# Patient Record
Sex: MALE | Race: WHITE
[De-identification: names, ages, dates, MRNs, and addresses within clinical notes are randomized per-mention and may not be internally consistent; named-entity substitution may affect disease eponyms.]

---

## 2018-07-05 ENCOUNTER — HOSPITAL ENCOUNTER (INPATIENT)
Dept: HOSPITAL 74 - JER | Age: 73
LOS: 5 days | Discharge: HOME | DRG: 392 | End: 2018-07-10
Attending: INTERNAL MEDICINE | Admitting: INTERNAL MEDICINE
Payer: COMMERCIAL

## 2018-07-05 VITALS — BODY MASS INDEX: 28.3 KG/M2

## 2018-07-05 DIAGNOSIS — E66.8: ICD-10-CM

## 2018-07-05 DIAGNOSIS — E11.9: ICD-10-CM

## 2018-07-05 DIAGNOSIS — K76.9: ICD-10-CM

## 2018-07-05 DIAGNOSIS — N28.9: ICD-10-CM

## 2018-07-05 DIAGNOSIS — R91.1: ICD-10-CM

## 2018-07-05 DIAGNOSIS — Z87.891: ICD-10-CM

## 2018-07-05 DIAGNOSIS — I10: ICD-10-CM

## 2018-07-05 DIAGNOSIS — I51.7: ICD-10-CM

## 2018-07-05 DIAGNOSIS — R91.8: ICD-10-CM

## 2018-07-05 DIAGNOSIS — K63.5: ICD-10-CM

## 2018-07-05 DIAGNOSIS — N17.9: ICD-10-CM

## 2018-07-05 DIAGNOSIS — E78.5: ICD-10-CM

## 2018-07-05 DIAGNOSIS — E86.0: ICD-10-CM

## 2018-07-05 DIAGNOSIS — R93.2: ICD-10-CM

## 2018-07-05 DIAGNOSIS — E87.2: ICD-10-CM

## 2018-07-05 DIAGNOSIS — K57.32: Primary | ICD-10-CM

## 2018-07-05 LAB
ALBUMIN SERPL-MCNC: 4 G/DL (ref 3.4–5)
ALP SERPL-CCNC: 93 U/L (ref 45–117)
ALT SERPL-CCNC: 32 U/L (ref 12–78)
ANION GAP SERPL CALC-SCNC: 12 MMOL/L (ref 8–16)
AST SERPL-CCNC: 24 U/L (ref 15–37)
BASOPHILS # BLD: 0.6 % (ref 0–2)
BILIRUB SERPL-MCNC: 0.9 MG/DL (ref 0.2–1)
BUN SERPL-MCNC: 27 MG/DL (ref 7–18)
CALCIUM SERPL-MCNC: 9.1 MG/DL (ref 8.5–10.1)
CHLORIDE SERPL-SCNC: 106 MMOL/L (ref 98–107)
CO2 SERPL-SCNC: 22 MMOL/L (ref 21–32)
CREAT SERPL-MCNC: 1.5 MG/DL (ref 0.7–1.3)
DEPRECATED RDW RBC AUTO: 13.8 % (ref 11.9–15.9)
EOSINOPHIL # BLD: 0.7 % (ref 0–4.5)
GLUCOSE SERPL-MCNC: 191 MG/DL (ref 74–106)
HCT VFR BLD CALC: 42.8 % (ref 35.4–49)
HGB BLD-MCNC: 14.5 GM/DL (ref 11.7–16.9)
LYMPHOCYTES # BLD: 18.4 % (ref 8–40)
MCH RBC QN AUTO: 32.2 PG (ref 25.7–33.7)
MCHC RBC AUTO-ENTMCNC: 34 G/DL (ref 32–35.9)
MCV RBC: 94.6 FL (ref 80–96)
MONOCYTES # BLD AUTO: 8.2 % (ref 3.8–10.2)
NEUTROPHILS # BLD: 72.1 % (ref 42.8–82.8)
PLATELET # BLD AUTO: 250 K/MM3 (ref 134–434)
PMV BLD: 8.1 FL (ref 7.5–11.1)
POTASSIUM SERPLBLD-SCNC: 3.9 MMOL/L (ref 3.5–5.1)
PROT SERPL-MCNC: 7.5 G/DL (ref 6.4–8.2)
RBC # BLD AUTO: 4.52 M/MM3 (ref 4–5.6)
SODIUM SERPL-SCNC: 140 MMOL/L (ref 136–145)
WBC # BLD AUTO: 10.7 K/MM3 (ref 4–10)

## 2018-07-05 RX ADMIN — MORPHINE SULFATE PRN MG: 2 INJECTION, SOLUTION INTRAMUSCULAR; INTRAVENOUS at 17:02

## 2018-07-05 RX ADMIN — INSULIN ASPART SCH: 100 INJECTION, SOLUTION INTRAVENOUS; SUBCUTANEOUS at 21:15

## 2018-07-05 RX ADMIN — SODIUM CHLORIDE SCH MLS/HR: 9 INJECTION, SOLUTION INTRAVENOUS at 17:00

## 2018-07-05 RX ADMIN — MORPHINE SULFATE PRN MG: 2 INJECTION, SOLUTION INTRAMUSCULAR; INTRAVENOUS at 21:17

## 2018-07-05 RX ADMIN — INSULIN ASPART SCH: 100 INJECTION, SOLUTION INTRAVENOUS; SUBCUTANEOUS at 16:46

## 2018-07-05 RX ADMIN — HEPARIN SODIUM SCH UNIT: 5000 INJECTION, SOLUTION INTRAVENOUS; SUBCUTANEOUS at 21:12

## 2018-07-05 NOTE — PDOC
History of Present Illness





- General


Chief Complaint: Pain


Stated Complaint: ABDOMINAL LEIGH


Time Seen by Provider: 07/05/18 08:08





- History of Present Illness


Initial Comments: 





07/05/18 08:20


Mr. Cabrales is a 74 yo male w/ pmh of HTN, HLD, DM who presents for evaluation 

of 2 day history of abdominal pain. He reports it started 2 days ago with 

associated diarrhea and that afterwards he has had the sensation of needing to 

defecate however has not been able to. He localizes the pain to his lower 

abdomen. Patient reports he had a similar experience one time previously which 

self resolved after several days.





The patient denies chest pain, shortness of breath, headache and dizziness. 

Denies fever, chills, nausea, vomit, and constipation. Denies dysuria, frequency

, urgency and hematuria. 


Allergies: NKDA





Past History





- Past Medical History


Allergies/Adverse Reactions: 


 Allergies











Allergy/AdvReac Type Severity Reaction Status Date / Time


 


No Known Allergies Allergy   Verified 07/05/18 07:59











Home Medications: 


Ambulatory Orders





Lisinopril 20 mg PO DAILY 07/05/18 


Metformin HCl [Metformin HCl ER] 500 mg PO BID 07/05/18 


Multivitamin [Multiple Vitamins] 1 each PO DAILY 07/05/18 


Rosuvastatin [Crestor -] 20 mg PO DAILY 07/05/18 











- Suicide/Smoking/Psychosocial Hx


Smoking History: Never smoked





**Review of Systems





- Review of Systems


Comments:: 





07/05/18 08:41


GENERAL/CONSTITUTIONAL: No fever or chills. No weakness.


HEAD, EYES, EARS, NOSE AND THROAT: No change in vision. No ear pain or 

discharge. No sore throat.


CARDIOVASCULAR: No chest pain or shortness of breath


RESPIRATORY: No cough, wheezing, or hemoptysis.


GASTROINTESTINAL: +Diarrhea and abdominal pain as described. No nausea, vomiting

, or constipation.


GENITOURINARY: No dysuria, frequency, or change in urination.


MUSCULOSKELETAL: No joint or muscle swelling or pain. No neck or back pain.


SKIN: No rash


NEUROLOGIC: No headache, vertigo, loss of consciousness, or change in strength/

sensation.


ENDOCRINE: No increased thirst. No abnormal weight change


HEMATOLOGIC/LYMPHATIC: No anemia, easy bleeding, or history of blood clots.


ALLERGIC/IMMUNOLOGIC: No hives or skin allergy.





*Physical Exam





- Vital Signs


 Last Vital Signs











Temp Pulse Resp BP Pulse Ox


 


 98.1 F   57 L  16   136/54   8 L


 


 07/05/18 08:00  07/05/18 08:00  07/05/18 08:00  07/05/18 08:00  07/05/18 08:00














- Physical Exam


Comments: 





07/05/18 08:42


GENERAL: Awake, alert, and fully oriented, in no acute distress


HEAD: No signs of trauma, normocephalic, atraumatic 


EYES: PERRLA, EOMI, sclera anicteric, conjunctiva clear


ENT: Auricles normal inspection, hearing grossly normal, nares patent, 

oropharynx clear without


exudates. Moist mucosa


NECK: Normal ROM, supple, no lymphadenopathy, JVD, or masses


LUNGS: No distress, speaks full sentences, clear to auscultation bilaterally 


HEART: Regular rate and rhythm, normal S1 and S2, no murmurs, rubs or gallops, 

peripheral pulses normal and equal bilaterally. 


ABDOMEN: +Bilateral lower abdominal TTP (L>R). Soft, normoactive bowel sounds. 

No guarding, no rebound. No masses


EXTREMITIES: Normal inspection, Normal range of motion, no edema. No clubbing 

or cyanosis. 


NEUROLOGICAL: Cranial nerves II through XII grossly intact. Normal speech, 

normal gait, no focal sensorimotor deficits 


SKIN: Warm, Dry, normal turgor, no rashes or lesions noted. 





ED Treatment Course





- LABORATORY


CBC & Chemistry Diagram: 


 07/05/18 09:10





 07/05/18 09:10





Medical Decision Making





- Medical Decision Making





07/05/18 12:25


Mr. Cabrales is a 74 yo male w/ pmh as described who presents for evaluation of 

abdominal pain and diarrhea. Patient given morphine for pain control and CT 

sent for evaluation. 





Patient noted to have acute uncomplicated sigmoid diverticulitis. 





CT also significant for "Multiple nonspecific hepatic hypodense lesions are 

noted suggestive of metastatic neoplastic disease. CT/ultrasound-guided biopsy 

may be considered."





Discussed results with patient; patient's PCP is in [place] - given patient's 

continuation of pain requiring IV pain medications and questionable findings 

for metastatic neoplastic disease will admit for further evaluation.





*DC/Admit/Observation/Transfer


Diagnosis at time of Disposition: 


 Diverticulitis, Liver lesion





Abdominal pain


Qualifiers:


 Abdominal location: unspecified location Qualified Code(s): R10.9 - 

Unspecified abdominal pain








- Discharge Dispostion


Decision to Admit order: Yes





- Referrals





- Patient Instructions





- Post Discharge Activity

## 2018-07-05 NOTE — CONSULT
Consult


Consult Specialty:: Surgery


Referred by:: Regina


Reason for Consultation:: Abdominal pain , acute divertivulitis.





- History of Present Illness


Chief Complaint: 72 year old man who lives in [place], is admitted with 2 day 

history of lower abdominal pain, had diarrhea earlier but has had no bowel 

movement in the last 2 days,.  Pain got worse and he came to the emergency 

room.  He has had similar symptoms about a month ago, but did not seek medical 

attenetion,.  Denies any vomiting, rectal bleeding, weight loss.  He has had 

colonoscopy about 2 years ago.  No change in bowel habits.





- History Source


History Provided By: Patient


Limitations to Obtaining History: No Limitations





- Past Surgical History


Past Surgical History: Yes: Arthrosocopy (He has had arthroscopy for ligament 

tear of his right knee.)





- Smoking History


Smoking history: Current every day smoker (Smokes a cigar for many years.)





Home Medications





- Allergies


Allergies/Adverse Reactions: 


 Allergies











Allergy/AdvReac Type Severity Reaction Status Date / Time


 


No Known Allergies Allergy   Verified 07/05/18 07:59














- Home Medications


Home Medications: 


Ambulatory Orders





Lisinopril 20 mg PO DAILY 07/05/18 


Metformin HCl [Metformin HCl ER] 500 mg PO BID 07/05/18 


Multivitamin [Multiple Vitamins] 1 each PO DAILY 07/05/18 


Rosuvastatin [Crestor -] 20 mg PO DAILY 07/05/18 











Review of Systems





- Review of Systems


Gastrointestinal: reports: Abdominal Pain





Physical Exam


Vital Signs: 


 Vital Signs











Temperature  98.0 F   07/05/18 12:00


 


Pulse Rate  71   07/05/18 15:11


 


Respiratory Rate  18   07/05/18 15:11


 


Blood Pressure  136/85   07/05/18 15:11


 


O2 Sat by Pulse Oximetry (%)  100   07/05/18 15:11











Gastrointestinal: Yes: Abdomen, Obese (Abdomen , no scars, tender with guarding 

in both lower quadrants, more on the left than on the right.  No distention, no 

hernia, genitals are normal, rectal exam is  normal.)


Labs: 


 CBC, BMP





 07/05/18 09:10 





 07/05/18 09:10 











Imaging





- Results


Cat Scan: Report Reviewed (CT scan with divericulitis of sigmoid colon.  

Multiple nodules in the liver, and a 2.7 cm. nodule in left lung, suggestive of 

metastatic disease.)





Problem List





- Problems


(1) Abdominal pain


Code(s): R10.9 - UNSPECIFIED ABDOMINAL PAIN   


Qualifiers: 


   Abdominal location: lower abdomen, unspecified   Qualified Code(s): R10.30 - 

Lower abdominal pain, unspecified   





(2) Sigmoid diverticulitis


Code(s): K57.32 - DVTRCLI OF LG INT W/O PERFORATION OR ABSCESS W/O BLEEDING   





(3) Abnormal liver diagnostic imaging


Code(s): R93.2 - ABNORMAL FINDINGS ON DX IMAGING OF LIVER AND BILIARY TRACT   





(4) Abnormal finding on lung imaging


Code(s): R91.8 - OTHER NONSPECIFIC ABNORMAL FINDING OF LUNG FIELD   





(5) Kidney dysfunction


Code(s): N28.9 - DISORDER OF KIDNEY AND URETER, UNSPECIFIED   





Assessment/Plan





Continue antibiotics, 


NPO, 


IV hydration.


 Pulmonary consult, 


 g.i. consult.


 Will follow.

## 2018-07-05 NOTE — PDOC
Attending Attestation





- HPI


HPI: 





07/05/18 09:25


The patient is a 73 year old male, with a significant PMH of HTN, HLD and DM 

who presents to the emergency department with severe abdominal pain which began 

approximately 2 days ago. Patient report pain is located to the left lower 

quadrant accompanied with diarrhea. Patient notes diarrhea is non bloody, non 

bilious, and constantly feels like going to the bathroom with a decrease in 

appetite. Patient also notes pain is sometimes relieved with peeing.  Patient 

reports a prior episode  3 months ago in [place] in which pain was more 

prominent to the right side and resolved on its own.  The patient denies chest 

pain, shortness of breath, headache and dizziness. Denies fever, chills, nausea

, vomit and constipation. Denies dysuria, frequency, urgency and hematuria.





Allergies: NKDA


Past surgical history:None reported 


Social history: Denies tobacco, alcohol and recreational drugs. 


PCP: None reported 





Documentation prepared by Marium Aguilar, acting as medical scribe for Malia Collier MD.














- Physicial Exam


PE: 





07/05/18 09:28


GENERAL: The patient is in no acute distress.


HEAD: Normal with no signs of trauma.


EYES: PERRLA, EOMI, sclera anicteric, conjunctiva clear.


ENT: Ears normal, nares patent, oropharynx clear without exudates.


Moist mucous membranes.


NECK: Normal range of motion, supple without lymphadenopathy, JVD, or masses.


LUNGS: Breath sounds equal, clear to auscultation bilaterally. No


wheezes, and no crackles.


HEART:Regular rate and rhythm, normal S1 and S2 without murmur, rub or gallop.


ABDOMEN: +Severe right lower quadrant and left lower quadrant pain, +

tenderness. No guarding, no rebound. 


rebound. No masses palpable.


EXTREMITIES: Normal range of motion, no edema. No clubbing or


cyanosis. No erythema, or tenderness.


NEUROLOGICAL: Cranial nerves II through XII grossly intact. Normal


speech. No focal neurological deficits.


MUSCULOSKELETAL: Back non-tender to palpation, no CVA tenderness


SKIN: Warm, Dry, normal turgor, no rashes or lesions noted.





Documentation prepared by Marium Aguilar, acting as medical scribe for Malia Collier MD.








<Marium Aguilar - Last Filed: 07/05/18 09:25>





- Resident


Resident Name: Bryant Lane





- ED Attending Attestation


I have performed the following: I have examined & evaluated the patient, The 

case was reviewed & discussed with the resident, I agree w/resident's findings 

& plan, Exceptions are as noted





- Medical Decision Making





07/05/18 14:01


72 yo m presenting to the Er with a complaint of abdominal pain


DD is broad and includes:


Diverticulitis, colitis, SBO, renal colic





 Laboratory Tests











  07/05/18 07/05/18 07/05/18





  09:10 09:10 09:10


 


WBC  10.7 H  


 


Hgb  14.5  


 


Hct  42.8  


 


Plt Count  250  


 


Sodium    140


 


Potassium    3.9


 


Chloride    106


 


Carbon Dioxide    22


 


BUN    27 H


 


Creatinine    1.5 H


 


Random Glucose    191 H


 


Lactic Acid   2.3 H* 














  07/05/18





  11:53


 


WBC 


 


Hgb 


 


Hct 


 


Plt Count 


 


Sodium 


 


Potassium 


 


Chloride 


 


Carbon Dioxide 


 


BUN 


 


Creatinine 


 


Random Glucose 


 


Lactic Acid  1.8











CT demonstrates uncomplicated diverticulitis, multiple liver lesions, 

concerning for metastatic disease


pt has required multiple doses of Morphine for pain





Will admit to hospitalist service


Given Levaquin and Flagyl





clinical Impression: diverticulitis, initial presentation


07/05/18 15:15





EKG: SR rate of 77 bpm, RBBB, LAD, t waves upright


 








<Malia Collier - Last Filed: 07/06/18 10:10>

## 2018-07-05 NOTE — HP
CHIEF COMPLAINT: abdominal pain





PCP:





HISTORY OF PRESENT ILLNESS:


The patient is a 74 yo m w/ PMH HTN, DM who comes into the ED c/o a 2 day hx 

abdominal pain and diarrhea. The patient's symptoms began with diarrhea 

Tuesday. The patient states he had approximately 3-4 episodes of diarrhea. 

Patient denies blood in the stool or change in color of stool. starting 

tuesdany night, he began to experience a sharp, 9/10 lower abdominal pain which 

was constant and nonradiating. Patient states that the pain is worst in the 

LLQ. In addition to this pain, the patient also endorses the urge to defecate, 

but inability to do so. Patient also endorses loss of appetite over this same 

period of time, with his last full meal being Monday night. The patient states 

that he had a similar episode of abdominal pain a few years ago whic resolved 

spontaneously. Patient had colonoscopy 2 yrs ago which was normal. Patient 

denies fevers, chills, CP, SOB, nausea, vomiting.





ER course was notable for:


(1) levofloxacin, flagyl, morphine


(2) CTAP showing acute diverticulitis, 2.7 cm lung nodule, hypodense lesions in 

liver





Recent Travel:


none





PAST MEDICAL HISTORY:


HTN, DM, HLD





PAST SURGICAL HISTORY:


right knee arthroscopy





Social History:


Smoking: smokes cigars 2x per week, smokes pipe daily


Alcohol: socially


Drugs: denies





Family History:


mother with DM


Father with CAD





Allergies





No Known Allergies Allergy (Verified 07/05/18 07:59)


 








HOME MEDICATIONS:


 Home Medications











 Medication  Instructions  Recorded


 


Lisinopril 20 mg PO DAILY 07/05/18


 


Metformin HCl [Metformin HCl ER] 500 mg PO BID 07/05/18


 


Multivitamin [Multiple Vitamins] 1 each PO DAILY 07/05/18


 


Rosuvastatin [Crestor -] 20 mg PO DAILY 07/05/18








REVIEW OF SYSTEMS


CONSTITUTIONAL: 


Absent:  fever, chills, diaphoresis, generalized weakness, malaise, loss of 

appetite, weight change


HEENT: 


Absent:  rhinorrhea, nasal congestion, throat pain, throat swelling, difficulty 

swallowing, mouth swelling, ear pain, eye pain, visual changes


CARDIOVASCULAR: 


Absent: chest pain, syncope, palpitations, irregular heart rate, lightheadedness

, peripheral edema


RESPIRATORY: 


Absent: cough, shortness of breath, dyspnea with exertion, orthopnea, wheezing, 

stridor, hemoptysis


GASTROINTESTINAL:


Absent: abdominal distension, nausea, vomiting, constipation, melena, 

hematochezia


GENITOURINARY: 


Absent: dysuria, frequency, urgency, hesitancy, hematuria, flank pain, genital 

pain


MUSCULOSKELETAL: 


Absent: myalgia, arthralgia, joint swelling, back pain, neck pain


SKIN: 


Absent: rash, itching, pallor


HEMATOLOGIC/IMMUNOLOGIC: 


Absent: easy bleeding, easy bruising, lymphadenopathy, frequent infections


ENDOCRINE:


Absent: unexplained weight gain, unexplained weight loss, heat intolerance, 

cold intolerance


NEUROLOGIC: 


Absent: headache, focal weakness or paresthesias, dizziness, unsteady gait, 

seizure, mental status changes, bladder or bowel incontinence


PSYCHIATRIC: 


Absent: anxiety, depression, suicidal or homicidal ideation, hallucinations.








PHYSICAL EXAMINATION


 Vital Signs - 24 hr











  07/05/18 07/05/18 07/05/18





  08:00 08:17 12:00


 


Temperature 98.1 F  98.0 F


 


Pulse Rate 57 L  


 


Pulse Rate [   60





Right]   


 


Respiratory 16  18





Rate   


 


Blood Pressure 136/54  


 


Blood Pressure   133/77





[Right Arm]   


 


O2 Sat by Pulse 98 98 98





Oximetry (%)   














  07/05/18 07/05/18 07/05/18





  15:11 16:00 19:45


 


Temperature  97.4 F L 97.7 F


 


Pulse Rate  76 78


 


Pulse Rate [ 71  





Right]   


 


Respiratory 18 18 18





Rate   


 


Blood Pressure  141/78 121/75


 


Blood Pressure 136/85  





[Right Arm]   


 


O2 Sat by Pulse 100 98 





Oximetry (%)   











GENERAL: Awake, alert, and fully oriented, in no acute distress.


HEAD: Normal with no signs of trauma.


EYES: Pupils equal, round and reactive to light, extraocular movements intact, 

sclera anicteric, conjunctiva clear. No lid lag.


EARS, NOSE, THROAT: oropharynx clear without exudates. Moist mucous membranes.


NECK: Normal range of motion, supple without lymphadenopathy, JVD, or masses.


LUNGS: Breath sounds equal, clear to auscultation bilaterally. No wheezes, and 

no crackles. No accessory muscle use.


HEART: Regular rate and rhythm, normal S1 and S2 without murmur, rub or gallop.


ABDOMEN: Soft, not distended, normoactive bowel sounds. Tenderness to palpation 

in RLQ and LLQ with the pain being worse in LLQ. Voluntary guarding noted. No 

rebound.  


LOWER EXTREMITIES: 2+ pulses, warm, well-perfused. No calf tenderness. No 

peripheral edema. 


NEUROLOGICAL:  Cranial nerves II-X intact. Normal speech. Strength 5/5 in all 

four extremities


SKIN: Warm, dry, normal turgor, no rashes or lesions noted, normal capillary 

refill. 





 Laboratory Results - last 24 hr











  07/05/18 07/05/18 07/05/18





  09:10 09:10 09:10


 


WBC  10.7 H  


 


RBC  4.52  


 


Hgb  14.5  


 


Hct  42.8  


 


MCV  94.6  


 


MCH  32.2  


 


MCHC  34.0  


 


RDW  13.8  


 


Plt Count  250  


 


MPV  8.1  


 


Absolute Neuts (auto)  7.7  


 


Neutrophils %  72.1  


 


Lymphocytes %  18.4  


 


Monocytes %  8.2  


 


Eosinophils %  0.7  


 


Basophils %  0.6  


 


Nucleated RBC %  0  


 


Sodium    140


 


Potassium    3.9


 


Chloride    106


 


Carbon Dioxide    22


 


Anion Gap    12


 


BUN    27 H


 


Creatinine    1.5 H


 


Creat Clearance w eGFR    45.62


 


POC Glucometer   


 


Random Glucose    191 H


 


Lactic Acid   2.3 H* 


 


Calcium    9.1


 


Total Bilirubin    0.9


 


AST    24


 


ALT    32


 


Alkaline Phosphatase    93


 


Total Protein    7.5


 


Albumin    4.0














  07/05/18 07/05/18





  11:53 16:38


 


WBC  


 


RBC  


 


Hgb  


 


Hct  


 


MCV  


 


MCH  


 


MCHC  


 


RDW  


 


Plt Count  


 


MPV  


 


Absolute Neuts (auto)  


 


Neutrophils %  


 


Lymphocytes %  


 


Monocytes %  


 


Eosinophils %  


 


Basophils %  


 


Nucleated RBC %  


 


Sodium  


 


Potassium  


 


Chloride  


 


Carbon Dioxide  


 


Anion Gap  


 


BUN  


 


Creatinine  


 


Creat Clearance w eGFR  


 


POC Glucometer   120


 


Random Glucose  


 


Lactic Acid  1.8 


 


Calcium  


 


Total Bilirubin  


 


AST  


 


ALT  


 


Alkaline Phosphatase  


 


Total Protein  


 


Albumin  











ASSESSMENT/PLAN:


The patient is a 74 yo m w/ PMH HTN, DM comes in complaining of lower abdominal 

pain, diarrhea and decreased appetite found to have acute diverticulitis with 

an incidental finding of lung nodule and liver lesions on CT. 





# abdominal pain an diarrhea 2/2 acute uncomplicated diverticulitis


   -confirmed by CT


   -surgery consult   


   -GI consult


   -NS @ 73


   -s/p levaquin and flagyl; will continue


   -NPO


   -pain control w/ morphine 2mg q4h and IV tylenol 1g





#Lung nodule and liver lesions


   -heme-onc consult


   -CT chest 


   


 #cardiomegaly on CXR


   -f/u CT chest


   -echo in AM





#ADDIS 2/2 dehydration


   -NS @ 73


   -1L bolus


   -monitor creatinine





#FEN


   -NS @73


   -lytes WNL, replete PRN


   -NPO





#Prophy


   -heparin sq 5k units TID





#Dispo


   -admit med surg 





Visit type





- Emergency Visit


Emergency Visit: Yes


ED Registration Date: 07/05/18


Care time: The patient presented to the Emergency Department on the above date 

and was hospitalized for further evaluation of their emergent condition.





- New Patient


This patient is new to me today: Yes


Date on this admission: 07/05/18





- Critical Care


Critical Care patient: No





Hospitalist Screening





- Colonoscopy Questionnaire


Colonoscopy Questionnaire: 





Colonoscopy Questionnaire








-   Patient:


50 - 75 years old and never had a screening colonoscopy: No


History of colon or rectal polyps, or CA: Yes


History of IBD, Crohn's disease or UC: No


History of abdominal radiation therapy as a child: No





-   Relative:


1 with colon or rectal CA, or polyps at age 60 or younger: Unknown


Colon or rectal CA diagnosed at age 45 or younger: Unknown


Multiple relatives with colon or rectal CA: Unknown





-   Outcome:


Screening Result: Positive Screen

## 2018-07-05 NOTE — PN
Teaching Attending Note


Name of Resident: Marc Gill





ATTENDING PHYSICIAN STATEMENT





I saw and evaluated the patient.


I reviewed the resident's note and discussed the case with the resident.


I agree with the resident's findings and plan as documented with exceptions 

below.








SUBJECTIVE:


73 yom with PMHx of HTN, HLD, NIDDM, colonic polyps s/p resection (last 

colonoscopy 2 years ago, gets every 5 years) comes with 2 days of LLQ and RLQ 

abdominal pain, sharp intermittent with no clear exacerbating or relieving 

factors with few episodes of watery non bloody stools and decreased oral 

intake. Denies any fevers, chills, nausea, vomiting, dark or bloody stools or 

sick contacts or recent antibiotics. Currently feels better after receiving 

morphine in the ED. 


12 point ROS done, neg for unintentional weight loss, abdominal pain, change in 

bowels, cough, dyspnea, orthopnea/PND, leg swelling, urinary symptoms. 





OBJECTIVE:


 Vital Signs











 Period  Temp  Pulse  Resp  BP Sys/Tiwari  Pulse Ox


 


 Last 24 Hr  98.0 F-98.1 F  57-60  16-18  133-136/54-77  98-98








 Intake & Output











 07/02/18 07/03/18 07/04/18 07/05/18





 23:59 23:59 23:59 23:59


 


Weight    200 lb











GENERAL: Awake, alert, and fully oriented, in no acute distress.


HEAD: Normal with no signs of trauma.


EYES: Pupils equal, round and reactive to light, extraocular movements intact, 

sclera anicteric, conjunctiva clear. No lid lag.


EARS, NOSE, THROAT: Ears normal, nares patent, oropharynx clear without 

exudates. mildly dry mucous membrane


NECK: soft, supple, no JVD


LUNGS: Breath sounds equal, clear to auscultation bilaterally. No wheezes, and 

no crackles. No accessory muscle use.


HEART: S1S2 regular


ABDOMEN: Soft, ND, tenderness in RLQ/LLQ, no rebound, some voluntary guarding, 

but no rigidity, positive bowel sounds


MUSCULOSKELETAL: Normal range of motion at all joints. No bony deformities or 

tenderness. No CVA tenderness.


UPPER EXTREMITIES: 2+ pulses, warm, well-perfused. No cyanosis. No clubbing. No 

peripheral edema.


LOWER EXTREMITIES: 2+ pulses, warm, well-perfused. No calf tenderness. No 

peripheral edema. 


NEUROLOGICAL:  Cranial nerves II-XII intact. Normal speech. Gait deferred.


PSYCHIATRIC: Cooperative. Good eye contact. Appropriate mood and affect.


SKIN: Warm, dry, normal turgor, no rashes or lesions noted, normal capillary 

refill. 





 Home Medications











 Medication  Instructions  Recorded


 


Lisinopril 20 mg PO DAILY 07/05/18


 


Metformin HCl [Metformin HCl ER] 500 mg PO BID 07/05/18


 


Multivitamin [Multiple Vitamins] 1 each PO DAILY 07/05/18


 


Rosuvastatin [Crestor -] 20 mg PO DAILY 07/05/18








 Laboratory Results - last 24 hr











  07/05/18 07/05/18 07/05/18





  09:10 09:10 09:10


 


WBC  10.7 H  


 


RBC  4.52  


 


Hgb  14.5  


 


Hct  42.8  


 


MCV  94.6  


 


MCH  32.2  


 


MCHC  34.0  


 


RDW  13.8  


 


Plt Count  250  


 


MPV  8.1  


 


Absolute Neuts (auto)  7.7  


 


Neutrophils %  72.1  


 


Lymphocytes %  18.4  


 


Monocytes %  8.2  


 


Eosinophils %  0.7  


 


Basophils %  0.6  


 


Nucleated RBC %  0  


 


Sodium    140


 


Potassium    3.9


 


Chloride    106


 


Carbon Dioxide    22


 


Anion Gap    12


 


BUN    27 H


 


Creatinine    1.5 H


 


Creat Clearance w eGFR    45.62


 


Random Glucose    191 H


 


Lactic Acid   2.3 H* 


 


Calcium    9.1


 


Total Bilirubin    0.9


 


AST    24


 


ALT    32


 


Alkaline Phosphatase    93


 


Total Protein    7.5


 


Albumin    4.0














  07/05/18





  11:53


 


WBC 


 


RBC 


 


Hgb 


 


Hct 


 


MCV 


 


MCH 


 


MCHC 


 


RDW 


 


Plt Count 


 


MPV 


 


Absolute Neuts (auto) 


 


Neutrophils % 


 


Lymphocytes % 


 


Monocytes % 


 


Eosinophils % 


 


Basophils % 


 


Nucleated RBC % 


 


Sodium 


 


Potassium 


 


Chloride 


 


Carbon Dioxide 


 


Anion Gap 


 


BUN 


 


Creatinine 


 


Creat Clearance w eGFR 


 


Random Glucose 


 


Lactic Acid  1.8


 


Calcium 


 


Total Bilirubin 


 


AST 


 


ALT 


 


Alkaline Phosphatase 


 


Total Protein 


 


Albumin 








CT A/P images and results reviewed


CXR- large heart, left lower lung mass





ASSESSMENT AND PLAN:


73 yom with PMHx of HTN, HLD, NIDDM, colonic polyps admitted with acute 

uncomplicated sigmoid diverticulitis and found with hepatic lesions concerning 

for neoplastic disease and left lung nodule.





-Acute uncomplicated sigmoid diverticulitis


-ADDIS, likely dehdyration with ongoing ACEi use


-Lactic acidosis, likely dehydration+/- metformin use


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 

mass vs colon origin, ?prostatic origin


-HTN


-HLD


-NIDDM





Plan:


Levaquin/flagyl, IVF, NPO, pain control with morphine, Surgery consult. 


GI consult given h/o colonic polyps now with concerns for metastatic disease.


Check CT chest, Oncology input. Likely pulmonary consult. 


?CT guided hepatic vs lung biopsy. Check PSA. 


2D echo given enlarged heart, to guide hydration as also given concerns for 

metastatic process. check EKG. 


Hold lisinopril/Metformin/Statin.


ISS, BGM q6h. 


DVTPPX with heparin


Dispo pending clinical improvement. 


Plan discussed with patient in detail and all questions answered.


Findings of hepatic and lung lesion were discussed and further plan addressed, 

all questions answered. 


total admit time 65 min.

## 2018-07-06 LAB
ALBUMIN SERPL-MCNC: 3.3 G/DL (ref 3.4–5)
ALP SERPL-CCNC: 85 U/L (ref 45–117)
ALT SERPL-CCNC: 28 U/L (ref 12–78)
ANION GAP SERPL CALC-SCNC: 11 MMOL/L (ref 8–16)
AST SERPL-CCNC: 20 U/L (ref 15–37)
BASOPHILS # BLD: 0.8 % (ref 0–2)
BILIRUB SERPL-MCNC: 1 MG/DL (ref 0.2–1)
BUN SERPL-MCNC: 22 MG/DL (ref 7–18)
CALCIUM SERPL-MCNC: 7.8 MG/DL (ref 8.5–10.1)
CHLORIDE SERPL-SCNC: 107 MMOL/L (ref 98–107)
CO2 SERPL-SCNC: 22 MMOL/L (ref 21–32)
CREAT SERPL-MCNC: 1 MG/DL (ref 0.7–1.3)
DEPRECATED RDW RBC AUTO: 13.6 % (ref 11.9–15.9)
EOSINOPHIL # BLD: 0.7 % (ref 0–4.5)
GLUCOSE SERPL-MCNC: 113 MG/DL (ref 74–106)
HCT VFR BLD CALC: 39 % (ref 35.4–49)
HGB BLD-MCNC: 13.5 GM/DL (ref 11.7–16.9)
LYMPHOCYTES # BLD: 18.3 % (ref 8–40)
MAGNESIUM SERPL-MCNC: 1.6 MG/DL (ref 1.8–2.4)
MCH RBC QN AUTO: 32.5 PG (ref 25.7–33.7)
MCHC RBC AUTO-ENTMCNC: 34.7 G/DL (ref 32–35.9)
MCV RBC: 93.6 FL (ref 80–96)
MONOCYTES # BLD AUTO: 8.3 % (ref 3.8–10.2)
NEUTROPHILS # BLD: 71.9 % (ref 42.8–82.8)
PHOSPHATE SERPL-MCNC: 2.3 MG/DL (ref 2.5–4.9)
PLATELET # BLD AUTO: 215 K/MM3 (ref 134–434)
PMV BLD: 7.6 FL (ref 7.5–11.1)
POTASSIUM SERPLBLD-SCNC: 4.2 MMOL/L (ref 3.5–5.1)
PROT SERPL-MCNC: 6.8 G/DL (ref 6.4–8.2)
RBC # BLD AUTO: 4.17 M/MM3 (ref 4–5.6)
SODIUM SERPL-SCNC: 140 MMOL/L (ref 136–145)
WBC # BLD AUTO: 8.6 K/MM3 (ref 4–10)

## 2018-07-06 RX ADMIN — INSULIN ASPART SCH: 100 INJECTION, SOLUTION INTRAVENOUS; SUBCUTANEOUS at 21:28

## 2018-07-06 RX ADMIN — HEPARIN SODIUM SCH UNIT: 5000 INJECTION, SOLUTION INTRAVENOUS; SUBCUTANEOUS at 05:37

## 2018-07-06 RX ADMIN — INSULIN ASPART SCH: 100 INJECTION, SOLUTION INTRAVENOUS; SUBCUTANEOUS at 06:22

## 2018-07-06 RX ADMIN — SODIUM CHLORIDE SCH MLS/HR: 9 INJECTION, SOLUTION INTRAVENOUS at 05:41

## 2018-07-06 RX ADMIN — PANTOPRAZOLE SODIUM SCH MG: 40 INJECTION, POWDER, FOR SOLUTION INTRAVENOUS at 10:46

## 2018-07-06 RX ADMIN — HEPARIN SODIUM SCH UNIT: 5000 INJECTION, SOLUTION INTRAVENOUS; SUBCUTANEOUS at 21:28

## 2018-07-06 RX ADMIN — MORPHINE SULFATE PRN MG: 2 INJECTION, SOLUTION INTRAMUSCULAR; INTRAVENOUS at 03:00

## 2018-07-06 RX ADMIN — HEPARIN SODIUM SCH UNIT: 5000 INJECTION, SOLUTION INTRAVENOUS; SUBCUTANEOUS at 13:48

## 2018-07-06 RX ADMIN — INSULIN ASPART SCH: 100 INJECTION, SOLUTION INTRAVENOUS; SUBCUTANEOUS at 12:08

## 2018-07-06 RX ADMIN — MAGNESIUM SULFATE IN DEXTROSE SCH GM: 10 INJECTION, SOLUTION INTRAVENOUS at 14:02

## 2018-07-06 RX ADMIN — MAGNESIUM SULFATE IN DEXTROSE SCH GM: 10 INJECTION, SOLUTION INTRAVENOUS at 14:58

## 2018-07-06 RX ADMIN — INSULIN ASPART SCH UNIT: 100 INJECTION, SOLUTION INTRAVENOUS; SUBCUTANEOUS at 17:09

## 2018-07-06 NOTE — PN
Teaching Attending Note


Name of Resident: Yazan Rankin





ATTENDING PHYSICIAN STATEMENT





I saw and evaluated the patient.


I reviewed the resident's note and discussed the case with the resident.


I agree with the resident's findings and plan as documented with exceptions 

below.








SUBJECTIVE:


patient seen and examined, abdominal pain markedly improved. no fevers, chills, 

nausea, vomiting. NO fevers/chills. 





OBJECTIVE:


 Vital Signs











 Period  Temp  Pulse  Resp  BP Sys/Tiwari  Pulse Ox


 


 Last 24 Hr  97.3 F-98.6 F  73-78  16-18  121-137/68-88  98-98








 Intake & Output











 07/03/18 07/04/18 07/05/18 07/06/18





 23:59 23:59 23:59 23:59


 


Intake Total    1375


 


Balance    1375


 


Weight   203 lb 8 oz 








General: sitting in bed in no acute distress


Abdomen:soft, ND, minimal LLQ voluntary guarding, markedly improved today. mild 

LLQ/RLQ tenderness, no rigidity, positive bowel sounds





 Home Medications











 Medication  Instructions  Recorded


 


Lisinopril 20 mg PO DAILY 07/05/18


 


Metformin HCl [Metformin HCl ER] 500 mg PO BID 07/05/18


 


Multivitamin [Multiple Vitamins] 1 each PO DAILY 07/05/18


 


Rosuvastatin [Crestor -] 20 mg PO DAILY 07/05/18








Active Medications





Acetaminophen (Ofirmev Injection -)  1,000 mg IVPB Q6H PRN


   PRN Reason: FEVER


Heparin Sodium (Porcine) (Heparin -)  5,000 unit SQ TID [facility]


   Last Admin: 07/06/18 13:48 Dose:  5,000 unit


Metronidazole (Flagyl 500mg Premixed Ivpb -)  500 mg in 100 mls @ 100 mls/hr 

IVPB Q8H-IV [facility]


   Last Admin: 07/06/18 10:44 Dose:  100 mls/hr


Levofloxacin (Levaquin 500 Mg Premixed Ivpb -)  500 mg in 100 mls @ 100 mls/hr 

IVPB DAILY [facility]; Protocol


   Last Admin: 07/06/18 10:46 Dose:  100 mls/hr


Sodium Chloride (Normal Saline -)  1,000 mls @ 75 mls/hr IV [facility]IR [facility]


   Last Admin: 07/06/18 05:41 Dose:  75 mls/hr


Insulin Aspart (Novolog Vial Sliding Scale -)  1 vial SQ ACHS [facility]; Protocol


   Last Admin: 07/06/18 17:09 Dose:  1 unit


Morphine Sulfate (Morphine Sulfate)  1 mg IVPUSH Q6H PRN


   PRN Reason: PAIN LEVEL 6-10


Pantoprazole Sodium (Protonix Iv)  40 mg IVPUSH DAILY [facility]


   Last Admin: 07/06/18 10:46 Dose:  40 mg





 Laboratory Results - last 24 hr











  07/05/18 07/06/18 07/06/18





  21:13 05:36 07:15


 


WBC    8.6


 


RBC    4.17


 


Hgb    13.5


 


Hct    39.0


 


MCV    93.6


 


MCH    32.5


 


MCHC    34.7


 


RDW    13.6


 


Plt Count    215


 


MPV    7.6


 


Absolute Neuts (auto)    6.2


 


Neutrophils %    71.9


 


Lymphocytes %    18.3


 


Monocytes %    8.3


 


Eosinophils %    0.7


 


Basophils %    0.8


 


Nucleated RBC %    0


 


Sodium   


 


Potassium   


 


Chloride   


 


Carbon Dioxide   


 


Anion Gap   


 


BUN   


 


Creatinine   


 


Creat Clearance w eGFR   


 


POC Glucometer  116  122 


 


Random Glucose   


 


Calcium   


 


Phosphorus   


 


Magnesium   


 


Total Bilirubin   


 


AST   


 


ALT   


 


Alkaline Phosphatase   


 


Total Protein   


 


Albumin   














  07/06/18 07/06/18 07/06/18





  07:15 12:06 17:07


 


WBC   


 


RBC   


 


Hgb   


 


Hct   


 


MCV   


 


MCH   


 


MCHC   


 


RDW   


 


Plt Count   


 


MPV   


 


Absolute Neuts (auto)   


 


Neutrophils %   


 


Lymphocytes %   


 


Monocytes %   


 


Eosinophils %   


 


Basophils %   


 


Nucleated RBC %   


 


Sodium  140  


 


Potassium  4.2  


 


Chloride  107  


 


Carbon Dioxide  22  


 


Anion Gap  11  


 


BUN  22 H  


 


Creatinine  1.0  


 


Creat Clearance w eGFR  > 60  


 


POC Glucometer   115  196


 


Random Glucose  113 H D  


 


Calcium  7.8 L  


 


Phosphorus  2.3 L  


 


Magnesium  1.6 L  


 


Total Bilirubin  1.0  


 


AST  20  


 


ALT  28  


 


Alkaline Phosphatase  85  


 


Total Protein  6.8  


 


Albumin  3.3 L  








 Microbiology





07/05/18 16:30   Blood - Peripheral Venous   Blood Culture - Preliminary


                            NO GROWTH OBTAINED AFTER 24 HOURS, INCUBATION TO 

CONTINUE


                            FOR 4 DAYS.


07/05/18 16:30   Blood - Peripheral Venous   Blood Culture - Preliminary


                            NO GROWTH OBTAINED AFTER 24 HOURS, INCUBATION TO 

CONTINUE


                            FOR 4 DAYS.





CT chest results and images reviewed





ASSESSMENT AND PLAN:


73 yom with PMHx of HTN, HLD, NIDDM, colonic polyps admitted with acute 

uncomplicated sigmoid diverticulitis and found with hepatic lesions concerning 

for neoplastic disease and left lung nodule.





-Acute uncomplicated sigmoid diverticulitis


-ADDIS, likely dehdyration with ongoing ACEi use


-Lactic acidosis, likely dehydration+/- metformin use


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 

mass vs colon origin, ?prostatic origin


-HTN


-HLD


-NIDDM





Plan:


levaquin/flayl day 2, Advance to PO clears, taper morphine. Surgery input 

appreciated. GI input noted. Follow up tumor markers.


Colonoscopy plans based on work up.


Pulmonary input noted, Lung biopsy on Monday. 


Follow up oncology input.


follow up 2D echo. 


ISS, change BGM to ACHS. Hold lisinopril/metformin. resume statin once 

tolerating po. 


DVTPPX with heparin


Dispo pending above. 


Plan discussed with patient in detail, all questions answered.

## 2018-07-06 NOTE — CON.GI
Consult


Consult Specialty:: GI


Reason for Consultation:: Diverticulosis, abnormal liver and lung findings on CT





- History of Present Illness


History of Present Illness: 





Chart reviewed. Per initial intake: The patient is a 74 yo m w/ PMH HTN, DM who 

comes into the ED c/o a 2 day hx abdominal pain and diarrhea. The patient's 

symptoms began with diarrhea Tuesday. The patient states he had approximately 3-

4 episodes of diarrhea. Patient denies blood in the stool or change in color of 

stool. starting tuesdany night, he began to experience a sharp, 9/10 lower 

abdominal pain which was constant and nonradiating. Patient states that the 

pain is worst in the LLQ. In addition to this pain, the patient also endorses 

the urge to defecate, but inability to do so. Patient also endorses loss of 

appetite over this same period of time, with his last full meal being Monday 

night. The patient states that he had a similar episode of abdominal pain a few 

years ago whic resolved spontaneously. Patient denies fevers, chills, CP, SOB, 

nausea, vomiting.





ER course was notable for:


(1) levofloxacin, flagyl, morphine


(2) CTAP showing acute diverticulitis, 2.7 cm lung nodule, hypodense lesions in 

liver.








Colonoscopy 2.5y ago - 2 benign polyps. No prior history of diverticulitis, or 

colitis. Never had EGD.  Reports no chronic upper GI symptoms, jaundice, weight 

loss, or changes in BMs. No significant, GI-related family history. [place] 

[place] up until 3 days ago.























- History Source


History Provided By: Patient, Family Member, Medical Record





- Past Surgical History


Past Surgical History: Yes: Arthrosocopy (He has had arthroscopy for ligament 

tear of his right knee.)





- Alcohol/Substance Use


Hx Alcohol Use: Yes (occassional)





- Smoking History


Smoking history: Current every day smoker (Smokes a cigar for many years.)


Have you smoked in the past 12 months: Yes





Home Medications





- Allergies


Allergies/Adverse Reactions: 


 Allergies











Allergy/AdvReac Type Severity Reaction Status Date / Time


 


No Known Allergies Allergy   Verified 07/05/18 07:59














- Home Medications


Home Medications: 


Ambulatory Orders





Lisinopril 20 mg PO DAILY 07/05/18 


Metformin HCl [Metformin HCl ER] 500 mg PO BID 07/05/18 


Multivitamin [Multiple Vitamins] 1 each PO DAILY 07/05/18 


Rosuvastatin [Crestor -] 20 mg PO DAILY 07/05/18 











Family Disease History





- Family Disease History


Family History: Unremarkable





Review of Systems


Findings/Remarks: 





as per HPI, H&P ED





Physical Exam-GI


Vital Signs: 


 Vital Signs











Temperature  97.9 F   07/06/18 06:00


 


Pulse Rate  73   07/06/18 06:00


 


Respiratory Rate  16   07/06/18 06:00


 


Blood Pressure  126/71   07/06/18 06:00


 


O2 Sat by Pulse Oximetry (%)  98   07/05/18 20:21











Constitutional: Yes: Well Nourished, No Distress, Calm


Eyes: Yes: Conjunctiva Clear


HENT: Yes: Atraumatic


Neck: Yes: Supple


Cardiovascular: Yes: Regular Rate and Rhythm


Respiratory: Yes: Regular


Gastrointestinal Inspection: No: Ascites, Distention


...Auscultate: Yes: Normoactive Bowel Sounds


...Palpate: Yes: Soft.  No: Firm/Rigid, Guarding, Mass, Tenderness, Tenderness, 

Rebound


Neurological: Yes: Alert, Oriented


Labs: 


 CBC, BMP





 07/06/18 07:15 





 07/06/18 07:15 





 Laboratory Last Values











WBC  8.6 K/mm3 (4.0-10.0)   07/06/18  07:15    


 


RBC  4.17 M/mm3 (4.00-5.60)   07/06/18  07:15    


 


Hgb  13.5 GM/dL (11.7-16.9)   07/06/18  07:15    


 


Hct  39.0 % (35.4-49)   07/06/18  07:15    


 


MCV  93.6 fl (80-96)   07/06/18  07:15    


 


MCH  32.5 pg (25.7-33.7)   07/06/18  07:15    


 


MCHC  34.7 g/dl (32.0-35.9)   07/06/18  07:15    


 


RDW  13.6 % (11.9-15.9)   07/06/18  07:15    


 


Plt Count  215 K/MM3 (134-434)   07/06/18  07:15    


 


MPV  7.6 fl (7.5-11.1)   07/06/18  07:15    


 


Absolute Neuts (auto)  6.2 #  07/06/18  07:15    


 


Neutrophils %  71.9 % (42.8-82.8)   07/06/18  07:15    


 


Lymphocytes %  18.3 % (8-40)   07/06/18  07:15    


 


Monocytes %  8.3 % (3.8-10.2)   07/06/18  07:15    


 


Eosinophils %  0.7 % (0-4.5)   07/06/18  07:15    


 


Basophils %  0.8 % (0-2.0)   07/06/18  07:15    


 


Nucleated RBC %  0 % (0-0)   07/06/18  07:15    


 


Sodium  140 mmol/L (136-145)   07/06/18  07:15    


 


Potassium  4.2 mmol/L (3.5-5.1)   07/06/18  07:15    


 


Chloride  107 mmol/L ()   07/06/18  07:15    


 


Carbon Dioxide  22 mmol/L (21-32)   07/06/18  07:15    


 


Anion Gap  11  (8-16)   07/06/18  07:15    


 


BUN  22 mg/dL (7-18)  H  07/06/18  07:15    


 


Creatinine  1.0 mg/dL (0.7-1.3)   07/06/18  07:15    


 


Creat Clearance w eGFR  > 60  (>60)   07/06/18  07:15    


 


POC Glucometer  115 UNITS ()   07/06/18  12:06    


 


Random Glucose  191 mg/dL ()  H  07/05/18  09:10    


 


Lactic Acid  1.8 mmol/L (0.0-2.0)   07/05/18  11:53    


 


Calcium  7.8 mg/dL (8.5-10.1)  L  07/06/18  07:15    


 


Phosphorus  2.3 mg/dL (2.5-4.9)  L  07/06/18  07:15    


 


Magnesium  1.6 mg/dL (1.8-2.4)  L  07/06/18  07:15    


 


Total Bilirubin  1.0 mg/dL (0.2-1.0)   07/06/18  07:15    


 


AST  20 U/L (15-37)   07/06/18  07:15    


 


ALT  28 U/L (12-78)   07/06/18  07:15    


 


Alkaline Phosphatase  85 U/L ()   07/06/18  07:15    


 


Total Protein  6.8 g/dl (6.4-8.2)   07/06/18  07:15    


 


Albumin  3.3 g/dl (3.4-5.0)  L  07/06/18  07:15    








 Laboratory Last Values











WBC  8.6 K/mm3 (4.0-10.0)   07/06/18  07:15    


 


RBC  4.17 M/mm3 (4.00-5.60)   07/06/18  07:15    


 


Hgb  13.5 GM/dL (11.7-16.9)   07/06/18  07:15    


 


Hct  39.0 % (35.4-49)   07/06/18  07:15    


 


MCV  93.6 fl (80-96)   07/06/18  07:15    


 


MCH  32.5 pg (25.7-33.7)   07/06/18  07:15    


 


MCHC  34.7 g/dl (32.0-35.9)   07/06/18  07:15    


 


RDW  13.6 % (11.9-15.9)   07/06/18  07:15    


 


Plt Count  215 K/MM3 (134-434)   07/06/18  07:15    


 


MPV  7.6 fl (7.5-11.1)   07/06/18  07:15    


 


Absolute Neuts (auto)  6.2 #  07/06/18  07:15    


 


Neutrophils %  71.9 % (42.8-82.8)   07/06/18  07:15    


 


Lymphocytes %  18.3 % (8-40)   07/06/18  07:15    


 


Monocytes %  8.3 % (3.8-10.2)   07/06/18  07:15    


 


Eosinophils %  0.7 % (0-4.5)   07/06/18  07:15    


 


Basophils %  0.8 % (0-2.0)   07/06/18  07:15    


 


Nucleated RBC %  0 % (0-0)   07/06/18  07:15    


 


Sodium  140 mmol/L (136-145)   07/06/18  07:15    


 


Potassium  4.2 mmol/L (3.5-5.1)   07/06/18  07:15    


 


Chloride  107 mmol/L ()   07/06/18  07:15    


 


Carbon Dioxide  22 mmol/L (21-32)   07/06/18  07:15    


 


Anion Gap  11  (8-16)   07/06/18  07:15    


 


BUN  22 mg/dL (7-18)  H  07/06/18  07:15    


 


Creatinine  1.0 mg/dL (0.7-1.3)   07/06/18  07:15    


 


Creat Clearance w eGFR  > 60  (>60)   07/06/18  07:15    


 


POC Glucometer  115 UNITS ()   07/06/18  12:06    


 


Random Glucose  191 mg/dL ()  H  07/05/18  09:10    


 


Lactic Acid  1.8 mmol/L (0.0-2.0)   07/05/18  11:53    


 


Calcium  7.8 mg/dL (8.5-10.1)  L  07/06/18  07:15    


 


Phosphorus  2.3 mg/dL (2.5-4.9)  L  07/06/18  07:15    


 


Magnesium  1.6 mg/dL (1.8-2.4)  L  07/06/18  07:15    


 


Total Bilirubin  1.0 mg/dL (0.2-1.0)   07/06/18  07:15    


 


AST  20 U/L (15-37)   07/06/18  07:15    


 


ALT  28 U/L (12-78)   07/06/18  07:15    


 


Alkaline Phosphatase  85 U/L ()   07/06/18  07:15    


 


Total Protein  6.8 g/dl (6.4-8.2)   07/06/18  07:15    


 


Albumin  3.3 g/dl (3.4-5.0)  L  07/06/18  07:15    














Imaging





- Results


Cat Scan: Report Reviewed





Problem List





- Problems


(1) Lung nodule


Code(s): R91.1 - SOLITARY PULMONARY NODULE   





(2) Abdominal pain


Code(s): R10.9 - UNSPECIFIED ABDOMINAL PAIN   


Qualifiers: 


   Abdominal location: unspecified location   Qualified Code(s): R10.9 - 

Unspecified abdominal pain   





(3) Diverticulitis


Code(s): K57.92 - DVTRCLI OF INTEST, PART UNSP, W/O PERF OR ABSCESS W/O BLEED   





(4) Liver lesion


Code(s): K76.9 - LIVER DISEASE, UNSPECIFIED   





Assessment/Plan





A 73M with what appears to be an acute sigmoid diverticulitis with good 

response to Abx. A "metastatic" lesions in the liver and a lung nodule on CT. 


?malignancy (colon , stomach, pancreatico-biliary, other), ?disseminated 

diverticulitis infection. 


, AFP, CEA, , EGD, repeat colonoscopy in few weeks, or sooner 

depending on the work up results. Discussed with the patient and his wife. Onc 

consult. ?IR to biopsy one of the liver lesion

## 2018-07-06 NOTE — EKG
Test Reason : 

Blood Pressure : ***/*** mmHG

Vent. Rate : 077 BPM     Atrial Rate : 077 BPM

   P-R Int : 188 ms          QRS Dur : 142 ms

    QT Int : 430 ms       P-R-T Axes : 038 -58 017 degrees

   QTc Int : 486 ms

 

SINUS RHYTHM WITH OCCASIONAL PREMATURE VENTRICULAR COMPLEXES

RIGHT BUNDLE BRANCH BLOCK

LEFT ANTERIOR FASCICULAR BLOCK

*** BIFASCICULAR BLOCK ***

ABNORMAL ECG

NO PREVIOUS ECGS AVAILABLE

Confirmed by LALIT FERRARO, VELMA ([id number]) on 7/6/2018 9:32:48 AM

 

Referred By:             Confirmed By:VELMA COHN MD

## 2018-07-06 NOTE — CONSULT
Consult


Consult Specialty:: oncology


Referred by:: jose antonio





- History of Present Illness


Chief Complaint: 74 y/o patient here for abdominal pain/vomiting/diarrhea





- Past Medical History


CNS: No: Alzheimer's


Cardio/Vascular: Yes: HTN, Hyperlipdemia.  No: AFIB


Pulmonary: No: Asthma, COPD, O2 Dependent


Gastrointestinal: No: Ascites


Hepatobiliary: No: Cirrhosis


Renal/: No: Renal Failure





- Past Surgical History


Past Surgical History: Yes: Arthrosocopy (He has had arthroscopy for ligament 

tear of his right knee.)





- Alcohol/Substance Use


Hx Alcohol Use: Yes (occassional)





- Smoking History


Smoking history: Former smoker (Smokes a cigar for many years.)


Have you smoked in the past 12 months: No





- Social History


Usual Living Arrangement: With Spouse


Occupation: [employer]


History of Recent Travel: Yes





Home Medications





- Allergies


Allergies/Adverse Reactions: 


 Allergies











Allergy/AdvReac Type Severity Reaction Status Date / Time


 


No Known Allergies Allergy   Verified 07/05/18 07:59














- Home Medications


Home Medications: 


Ambulatory Orders





Lisinopril 20 mg PO DAILY 07/05/18 


Metformin HCl [Metformin HCl ER] 500 mg PO BID 07/05/18 


Multivitamin [Multiple Vitamins] 1 each PO DAILY 07/05/18 


Rosuvastatin [Crestor -] 20 mg PO DAILY 07/05/18 











Physical Exam


Vital Signs: 


 Vital Signs











Temperature  98.2 F   07/06/18 22:00


 


Pulse Rate  74   07/06/18 22:00


 


Respiratory Rate  18   07/06/18 22:00


 


Blood Pressure  132/78   07/06/18 22:00


 


O2 Sat by Pulse Oximetry (%)  98   07/06/18 21:00











Labs: 


 CBC, BMP





 07/06/18 07:15 





 07/06/18 07:15 











Assessment/Plan





74 y/o patient with HTN/hyperlipidemia, comes in with abdominal pain/diarrhea 

which is resolving. Being managed for  acute diverticulitis.





Lt. lung base 2.7cm nodule , left paratracheal node and liver lesions( left and 

rt. hepatic lobe, max. 2.4cm) incidentally noted on CT scans. 


tumor markers pending but interpretation would be difficult given ongoing 

infection/inflammation.





for biopsy of lung nodule/? liver lesions via IR next week.will discuss with IR.

## 2018-07-06 NOTE — PN
Progress Note, Physician


Chief Complaint: 





Patient feels better today, has minimal pain. No nausea, no vomiting.





- Current Medication List


Current Medications: 


Active Medications





Acetaminophen (Ofirmev Injection -)  1,000 mg IVPB Q6H PRN


   PRN Reason: FEVER


Heparin Sodium (Porcine) (Heparin -)  5,000 unit SQ TID [facility]


   Last Admin: 07/06/18 13:48 Dose:  5,000 unit


Metronidazole (Flagyl 500mg Premixed Ivpb -)  500 mg in 100 mls @ 100 mls/hr 

IVPB Q8H-IV [facility]


   Last Admin: 07/06/18 10:44 Dose:  100 mls/hr


Levofloxacin (Levaquin 500 Mg Premixed Ivpb -)  500 mg in 100 mls @ 100 mls/hr 

IVPB DAILY [facility]; Protocol


   Last Admin: 07/06/18 10:46 Dose:  100 mls/hr


Sodium Chloride (Normal Saline -)  1,000 mls @ 75 mls/hr IV ASDIR MARK


   Last Admin: 07/06/18 05:41 Dose:  75 mls/hr


Sodium Phosphate 30 mm/ (Dextrose)  510 mls @ 85 mls/hr IVPB ONCE ONE


   Stop: 07/06/18 18:46


Insulin Aspart (Novolog Vial Sliding Scale -)  1 vial SQ [facility]; Protocol


   Last Admin: 07/06/18 12:08 Dose:  Not Given


Morphine Sulfate (Morphine Sulfate)  2 mg IVPUSH Q4H PRN


   PRN Reason: PAIN LEVEL 6-10


   Last Admin: 07/06/18 03:00 Dose:  2 mg


Pantoprazole Sodium (Protonix Iv)  40 mg IVPUSH DAILY [facility]


   Last Admin: 07/06/18 10:46 Dose:  40 mg











- Objective


Vital Signs: 


 Vital Signs











Temperature  97.3 F L  07/06/18 09:00


 


Pulse Rate  73   07/06/18 09:00


 


Respiratory Rate  18   07/06/18 09:00


 


Blood Pressure  133/88   07/06/18 09:00


 


O2 Sat by Pulse Oximetry (%)  98   07/05/18 20:21











Gastrointestinal: Yes: Other (Abdomen is soft , not tender.)


Labs: 


 CBC, BMP





 07/06/18 07:15 





 07/06/18 07:15 











Problem List





- Problems


(1) Abdominal pain


Code(s): R10.9 - UNSPECIFIED ABDOMINAL PAIN   


Qualifiers: 


   Abdominal location: lower abdomen, unspecified   Qualified Code(s): R10.30 - 

Lower abdominal pain, unspecified   





(2) Sigmoid diverticulitis


Code(s): K57.32 - DVTRCLI OF LG INT W/O PERFORATION OR ABSCESS W/O BLEEDING   





(3) Abnormal liver diagnostic imaging


Code(s): R93.2 - ABNORMAL FINDINGS ON DX IMAGING OF LIVER AND BILIARY TRACT   





(4) Abnormal finding on lung imaging


Code(s): R91.8 - OTHER NONSPECIFIC ABNORMAL FINDING OF LUNG FIELD   





(5) Kidney dysfunction


Code(s): N28.9 - DISORDER OF KIDNEY AND URETER, UNSPECIFIED   





Assessment/Plan





Improved abdominal pain, minimal lower abdoiminal tenderness.


 No guarding. 


Continue g.i. and pulmonary workup. 


Started on clear liquids.


 Continue antibiotics.

## 2018-07-06 NOTE — CON.PULM
Consult


Consult Specialty:: PULMONARY


Referred by:: MAGDALENA


Reason for Consultation:: ABNORMAL CT CHEST





- History of Present Illness


Chief Complaint: RESOLVING ABD PAIN





- History Source


History Provided By: Patient, Family Member, Medical Record


Limitations to Obtaining History: No Limitations





- Past Medical History


CNS: No: Alzheimer's


Cardio/Vascular: Yes: HTN, Hyperlipdemia.  No: AFIB


Pulmonary: No: Asthma, COPD, O2 Dependent


Gastrointestinal: No: Ascites


Hepatobiliary: No: Cirrhosis


Renal/: No: Renal Failure


Heme/Onc: No: Anemia





- Past Surgical History


Past Surgical History: Yes: Arthrosocopy (He has had arthroscopy for ligament 

tear of his right knee.)





- Alcohol/Substance Use


Hx Alcohol Use: Yes (occassional)





- Smoking History


Smoking history: Former smoker (Smokes a cigar for many years.)


Have you smoked in the past 12 months: No





- Social History


Usual Living Arrangement: With Spouse


Occupation: [employer]


Place of Birth: [place]


History of Recent Travel: Yes





Home Medications





- Allergies


Allergies/Adverse Reactions: 


 Allergies











Allergy/AdvReac Type Severity Reaction Status Date / Time


 


No Known Allergies Allergy   Verified 07/05/18 07:59














- Home Medications


Home Medications: 


Ambulatory Orders





Lisinopril 20 mg PO DAILY 07/05/18 


Metformin HCl [Metformin HCl ER] 500 mg PO BID 07/05/18 


Multivitamin [Multiple Vitamins] 1 each PO DAILY 07/05/18 


Rosuvastatin [Crestor -] 20 mg PO DAILY 07/05/18 











Family Disease History





- Family Disease History


Family History: Unremarkable





Review of Systems





- Review of Systems


Constitutional: denies: Fever


Eyes: denies: Blurred Vision


HENT: denies: Difficult Swallowing


Neck: denies: Decreased ROM


Cardiovascular: denies: Chest Pain


Respiratory: denies: Cough


Gastrointestinal: reports: Abdominal Pain, Bloating, Constipation, Diarrhea.  

denies: Vomiting


Genitourinary: reports: No Symptoms


Breasts: reports: No Symptoms Reported


Musculoskeletal: reports: No Symptoms


Integumentary: reports: No Symptoms


Neurological: reports: No Symptoms





Physical Exam


Vital Sings: 


 Vital Signs











Temperature  98.6 F   07/06/18 14:58


 


Pulse Rate  78   07/06/18 14:58


 


Respiratory Rate  18   07/06/18 14:58


 


Blood Pressure  126/68   07/06/18 14:58


 


O2 Sat by Pulse Oximetry (%)  98   07/05/18 20:21











Constitutional: Yes: Calm


Eyes: Yes: EOM Intact


HENT: Yes: Normocephalic


Neck: Yes: Trachea Midline


Cardiovascular: Yes: Regular Rate and Rhythm


Respiratory: Yes: CTA Bilaterally


Gastrointestinal: Yes: Abdomen, Obese


Edema: No


Integumentary: Yes: WNL


Neurological: Yes: WNL


Labs: 


 CBC, BMP





 07/06/18 07:15 





 07/06/18 07:15 











Imaging





- Results


Chest X-ray: Report Reviewed, Image Reviewed


Cat Scan: Report Reviewed, Image Reviewed





Problem List





- Problems


(1) Liver lesion


Code(s): K76.9 - LIVER DISEASE, UNSPECIFIED   





(2) Lung nodule


Code(s): R91.1 - SOLITARY PULMONARY NODULE   





(3) Sigmoid diverticulitis


Code(s): K57.32 - DVTRCLI OF LG INT W/O PERFORATION OR ABSCESS W/O BLEEDING   





Assessment/Plan





INCIDENTAL FINDING OF LUNG MASS ON CT CHEST W POSSIBLE LIVER LESIONS


RESOLVING SIGMOID DIVERTICULITIS





HAVE DISCUSSED PRESENT FINDINGS WITH PATIENT AND HIS WIFE.


I EXPLAINED THAT A DIAGNOSTIC PROCEDURE IS NECESSARY


WE HAVE DISCUSSED VARIOUS OPTIONS INCLUDING BRONCHOSCOPY/AND NEEDLE BIOPSY


A TRANS-THORACIC NEEDLE BIOPSY WILL BE SCHEDULED FOR MONDAY BY IT.


WILL OBTAIN PT/INR


NPO MIDNIGHT SUNDAY


CONSENT WILL NEED TO BE OBTAINED








CARLA DICKENS MD

## 2018-07-06 NOTE — PN
Physical Exam: 


SUBJECTIVE: Patient seen and examined. No acute events overnight. Pt. endorses 

feeling better. Pt has not passed stool. Denies Chest pain, SOB or dizziness. 








OBJECTIVE:





 Vital Signs











 Period  Temp  Pulse  Resp  BP Sys/Tiwari  Pulse Ox


 


 Last 24 Hr  97.3 F-98.6 F  73-78  16-18  126-137/68-88  98











GENERAL: The patient is awake, alert, and fully oriented, in no acute distress. 


LUNGS: mild crackles on the left, no accessory muscle use. 


HEART: Regular rate and rhythm, S1, S2 without murmur, rub or gallop.


ABDOMEN: Soft, Right and Left lower quadrant tenderness, more tender on the left

, nondistended, normoactive bowel sounds, voluntary guarding, no 

hepatosplenomegaly, no masses.


EXTREMITIES: warm, well-perfused, no edema. 


PSYCH: Normal mood, normal affect.


SKIN: Warm, dry, no rashes or lesions noted














 Laboratory Results - last 24 hr











  07/05/18 07/06/18 07/06/18





  21:13 05:36 07:15


 


WBC    8.6


 


RBC    4.17


 


Hgb    13.5


 


Hct    39.0


 


MCV    93.6


 


MCH    32.5


 


MCHC    34.7


 


RDW    13.6


 


Plt Count    215


 


MPV    7.6


 


Absolute Neuts (auto)    6.2


 


Neutrophils %    71.9


 


Lymphocytes %    18.3


 


Monocytes %    8.3


 


Eosinophils %    0.7


 


Basophils %    0.8


 


Nucleated RBC %    0


 


Sodium   


 


Potassium   


 


Chloride   


 


Carbon Dioxide   


 


Anion Gap   


 


BUN   


 


Creatinine   


 


Creat Clearance w eGFR   


 


POC Glucometer  116  122 


 


Random Glucose   


 


Calcium   


 


Phosphorus   


 


Magnesium   


 


Total Bilirubin   


 


AST   


 


ALT   


 


Alkaline Phosphatase   


 


Total Protein   


 


Albumin   














  07/06/18 07/06/18 07/06/18





  07:15 12:06 17:07


 


WBC   


 


RBC   


 


Hgb   


 


Hct   


 


MCV   


 


MCH   


 


MCHC   


 


RDW   


 


Plt Count   


 


MPV   


 


Absolute Neuts (auto)   


 


Neutrophils %   


 


Lymphocytes %   


 


Monocytes %   


 


Eosinophils %   


 


Basophils %   


 


Nucleated RBC %   


 


Sodium  140  


 


Potassium  4.2  


 


Chloride  107  


 


Carbon Dioxide  22  


 


Anion Gap  11  


 


BUN  22 H  


 


Creatinine  1.0  


 


Creat Clearance w eGFR  > 60  


 


POC Glucometer   115  196


 


Random Glucose  113 H D  


 


Calcium  7.8 L  


 


Phosphorus  2.3 L  


 


Magnesium  1.6 L  


 


Total Bilirubin  1.0  


 


AST  20  


 


ALT  28  


 


Alkaline Phosphatase  85  


 


Total Protein  6.8  


 


Albumin  3.3 L  








Active Medications





 Current Medications





Acetaminophen (Ofirmev Injection -)  1,000 mg IVPB Q6H PRN


   PRN Reason: FEVER


Heparin Sodium (Porcine) (Heparin -)  5,000 unit SQ TID [facility]


   Last Admin: 07/06/18 13:48 Dose:  5,000 unit


Metronidazole (Flagyl 500mg Premixed Ivpb -)  500 mg in 100 mls @ 100 mls/hr 

IVPB Q8H-IV [facility]


   Last Admin: 07/06/18 19:09 Dose:  100 mls/hr


Levofloxacin (Levaquin 500 Mg Premixed Ivpb -)  500 mg in 100 mls @ 100 mls/hr 

IVPB DAILY [facility]; Protocol


   Last Admin: 07/06/18 10:46 Dose:  100 mls/hr


Sodium Chloride (Normal Saline -)  1,000 mls @ 75 mls/hr IV ASDIR [facility]


   Last Admin: 07/06/18 05:41 Dose:  75 mls/hr


Insulin Aspart (Novolog Vial Sliding Scale -)  1 vial SQ ACHS [facility]; Protocol


   Last Admin: 07/06/18 17:09 Dose:  1 unit


Morphine Sulfate (Morphine Sulfate)  1 mg IVPUSH Q6H PRN


   PRN Reason: PAIN LEVEL 6-10


Pantoprazole Sodium (Protonix Iv)  40 mg IVPUSH DAILY [facility]


   Last Admin: 07/06/18 10:46 Dose:  40 mg











ASSESSMENT/PLAN:


The patient is a 72 yo m w/ PMH HTN, DM comes in complaining of lower abdominal 

pain, diarrhea and decreased appetite found to have acute diverticulitis with 

an incidental finding of lung nodule and liver lesions on CT. 





# abdominal pain an diarrhea 2/2 acute uncomplicated diverticulitis


   -confirmed by CT


   -surgery consult   


   -GI consult


   -NS @ 73


   -s/p levaquin and flagyl; will continue


   -diet advanced to diabetic clears


   -pain control w/ morphine 2mg q4h and IV tylenol 1g


   -decreased WBC: 10.7--> 8.6


   -no fever





#Lung nodule and liver lesions


   -heme-onc consult


   -CT chest 


   


 #cardiomegaly on CXR


   -echo in AM





#ADDIS 2/2 dehydration


   -NS @ 75ml/hr


   -1L bolus


   -monitor creatinine


   - hold lisinopril, BP is wnl 


#FEN


   -NS @73


   -lytes WNL, replete PRN


   -diet advanced to diabetic clears


#DM2


   -BG wnl, c/w glucose monitoring


   -Hold Novolog and Metformin





#VTE PPx.


   -heparin sq 5k units TID





#Dispo


   -admit med surg 





Visit type





- Emergency Visit


Emergency Visit: No





- New Patient


This patient is new to me today: Yes


Date on this admission: 07/06/18





- Critical Care


Critical Care patient: No





- Discharge Referral


Referred to [facility] No

## 2018-07-07 LAB
ALBUMIN SERPL-MCNC: 3.4 G/DL (ref 3.4–5)
ALP SERPL-CCNC: 120 U/L (ref 45–117)
ALT SERPL-CCNC: 29 U/L (ref 12–78)
ANION GAP SERPL CALC-SCNC: 8 MMOL/L (ref 8–16)
AST SERPL-CCNC: 28 U/L (ref 15–37)
BASOPHILS # BLD: 0.7 % (ref 0–2)
BILIRUB SERPL-MCNC: 0.7 MG/DL (ref 0.2–1)
BUN SERPL-MCNC: 12 MG/DL (ref 7–18)
CALCIUM SERPL-MCNC: 8.7 MG/DL (ref 8.5–10.1)
CEA SERPL-MCNC: 32.9 NG/ML (ref 0–4.7)
CHLORIDE SERPL-SCNC: 106 MMOL/L (ref 98–107)
CO2 SERPL-SCNC: 26 MMOL/L (ref 21–32)
CREAT SERPL-MCNC: 1.1 MG/DL (ref 0.7–1.3)
DEPRECATED RDW RBC AUTO: 12.9 % (ref 11.9–15.9)
EOSINOPHIL # BLD: 2 % (ref 0–4.5)
GLUCOSE SERPL-MCNC: 144 MG/DL (ref 74–106)
HCT VFR BLD CALC: 37.2 % (ref 35.4–49)
HGB BLD-MCNC: 12.9 GM/DL (ref 11.7–16.9)
INR BLD: 1.35 (ref 0.82–1.09)
LYMPHOCYTES # BLD: 24.2 % (ref 8–40)
MAGNESIUM SERPL-MCNC: 2.1 MG/DL (ref 1.8–2.4)
MCH RBC QN AUTO: 32.2 PG (ref 25.7–33.7)
MCHC RBC AUTO-ENTMCNC: 34.6 G/DL (ref 32–35.9)
MCV RBC: 93 FL (ref 80–96)
MONOCYTES # BLD AUTO: 8.3 % (ref 3.8–10.2)
NEUTROPHILS # BLD: 64.8 % (ref 42.8–82.8)
PHOSPHATE SERPL-MCNC: 2.7 MG/DL (ref 2.5–4.9)
PLATELET # BLD AUTO: 231 K/MM3 (ref 134–434)
PMV BLD: 7.4 FL (ref 7.5–11.1)
POTASSIUM SERPLBLD-SCNC: 4.3 MMOL/L (ref 3.5–5.1)
PROT SERPL-MCNC: 6.9 G/DL (ref 6.4–8.2)
PT PNL PPP: 15.3 SEC (ref 9.7–13)
RBC # BLD AUTO: 4 M/MM3 (ref 4–5.6)
SODIUM SERPL-SCNC: 140 MMOL/L (ref 136–145)
WBC # BLD AUTO: 6 K/MM3 (ref 4–10)

## 2018-07-07 RX ADMIN — HEPARIN SODIUM SCH UNIT: 5000 INJECTION, SOLUTION INTRAVENOUS; SUBCUTANEOUS at 14:53

## 2018-07-07 RX ADMIN — SODIUM CHLORIDE SCH MLS/HR: 9 INJECTION, SOLUTION INTRAVENOUS at 10:14

## 2018-07-07 RX ADMIN — INSULIN ASPART SCH: 100 INJECTION, SOLUTION INTRAVENOUS; SUBCUTANEOUS at 17:00

## 2018-07-07 RX ADMIN — INSULIN ASPART SCH: 100 INJECTION, SOLUTION INTRAVENOUS; SUBCUTANEOUS at 06:17

## 2018-07-07 RX ADMIN — INSULIN ASPART SCH UNIT: 100 INJECTION, SOLUTION INTRAVENOUS; SUBCUTANEOUS at 11:19

## 2018-07-07 RX ADMIN — HEPARIN SODIUM SCH UNIT: 5000 INJECTION, SOLUTION INTRAVENOUS; SUBCUTANEOUS at 21:14

## 2018-07-07 RX ADMIN — PANTOPRAZOLE SODIUM SCH MG: 40 INJECTION, POWDER, FOR SOLUTION INTRAVENOUS at 10:14

## 2018-07-07 RX ADMIN — INSULIN ASPART SCH: 100 INJECTION, SOLUTION INTRAVENOUS; SUBCUTANEOUS at 21:15

## 2018-07-07 RX ADMIN — HEPARIN SODIUM SCH UNIT: 5000 INJECTION, SOLUTION INTRAVENOUS; SUBCUTANEOUS at 06:17

## 2018-07-07 NOTE — PN
Physical Exam: 


SUBJECTIVE: Patient seen and examined at bedside. Overnight, pt afebrile. 

Tolerating clear liq diet (without N/V), however c/o 4-5 episodes of watery 

stools since last night. Otherwise, states that his abdominal pain has improved 

and that he has not needed morphine in the last 24 hrs. Denies HA, fever, chills

, SOB, chest pain, or changes in urinary function.





OBJECTIVE:





 Vital Signs











 Period  Temp  Pulse  Resp  BP Sys/Tiwari  Pulse Ox


 


 Last 24 Hr  97.6 F-98.6 F  67-78  18-20  119-137/68-82  98











GENERAL: The patient is pleasant. awake, alert, and fully oriented, in no acute 

distress.


HEAD: Normal with no signs of trauma.


EYES: PERRL, extraocular movements intact, sclera anicteric, conjunctiva clear.


ENT: Ears normal, nares patent, oropharynx clear without exudates, moist mucous 

membranes.


NECK: Trachea midline, full range of motion, supple. 


LUNGS: +decreased breath sounds b/l. however CTA. without rhonchi, wheezes or 

crackles. no accessory m usage 


HEART: Regular rate and rhythm, S1, S2 without murmur, rub or gallop.


ABDOMEN: Soft, +mild TTP in RLQ, LLQ. nondistended, hyperactive bowel sounds, 

no guarding, no rebound


EXTREMITIES: 2+ pt pulses, warm, well-perfused, no edema. 


NEUROLOGICAL: Cranial nerves II through XII grossly intact. 


PSYCH: Normal mood, normal affect.


SKIN: Warm, dry, normal turgor





 Laboratory Tests











  07/07/18 07/07/18





  07:45 07:45


 


WBC  6.0 


 


Hgb  12.9 


 


Hct  37.2 


 


Plt Count  231 


 


Sodium   140


 


Potassium   4.3


 


Chloride   106


 


Carbon Dioxide   26


 


BUN   12


 


Creatinine   1.1


 


Calcium   8.7


 


Phosphorus   2.7











Active Medications











Generic Name Dose Route Start Last Admin





  Trade Name Freq  PRN Reason Stop Dose Admin


 


Acetaminophen  1,000 mg  07/05/18 15:48  





  Ofirmev Injection -  IVPB   





  Q6H PRN   





  FEVER   


 


Heparin Sodium (Porcine)  5,000 unit  07/05/18 22:00  07/07/18 06:17





  Heparin -  SQ   5,000 unit





  TID MARK   Administration


 


Metronidazole  500 mg in 100 mls @ 100 mls/hr  07/05/18 18:00  07/07/18 02:15





  Flagyl 500mg Premixed Ivpb -  IVPB   100 mls/hr





  Q8H-IV [facility]   Administration





     


 


Levofloxacin  500 mg in 100 mls @ 100 mls/hr  07/06/18 10:00  07/06/18 10:46





  Levaquin 500 Mg Premixed Ivpb -  IVPB   100 mls/hr





  DAILY MARK   Administration





  Protocol   





     


 


Sodium Chloride  1,000 mls @ 75 mls/hr  07/05/18 16:15  07/06/18 05:41





  Normal Saline -  IV   75 mls/hr





  ASDIR MARK   Administration


 


Insulin Aspart  1 vial  07/05/18 16:30  07/07/18 06:17





  Novolog Vial Sliding Scale -  SQ   Not Given





  [facility]   





  Protocol   





     


 


Morphine Sulfate  1 mg  07/06/18 18:47  





  Morphine Sulfate  IVPUSH   





  Q6H PRN   





  PAIN LEVEL 6-10   


 


Pantoprazole Sodium  40 mg  07/06/18 10:00  07/06/18 10:46





  Protonix Iv  IVPUSH   40 mg





  DAILY MARK   Administration








RECENT RESULTS





ECHO 7/6/18: LVSF WNL, mild mitral annular calcification, mild MR, mild AS. 

mild pulmonic valve regurg, no pericardial effusion





Microbiology





07/05/18 16:30   Blood - Peripheral Venous   Blood Culture - Preliminary


                              NO GROWTH OBTAINED AFTER 24 HOURS, INCUBATION TO 

CONTINUE


                              FOR 4 DAYS.


07/05/18 16:30   Blood - Peripheral Venous   Blood Culture - Preliminary


                              NO GROWTH OBTAINED AFTER 24 HOURS, INCUBATION TO 

CONTINUE


                              FOR 4 DAYS.





ASSESSMENT/PLAN:





72 y/o M with PMH HTN, HLD, DM, colonic polyps, admitted for acute 

uncomplicated sigmoid diverticulitis. Found to have hepatic lesions concerning 

for neoplastic disease and left lung nodule.





#Acute uncomplicated diverticulitis - improving


-afebrile, with improved abdominal pain. no white count


-continue flagyl 500mg IVPB q8h, levaquin 500mg IVPB q8h (Day2)


-F/u blood cx (-)24 hrs


-tolerating clear liquid diet, will advance 


-morphine 1mg IVP q6h PRN for pain control, IV Tylenol if febrile


-No surgical recs at this time, GI on board 





#Lung nodule, liver lesions


-For trans-thoracic needle bx (Monday) of lung nodule


-Elevated CEA (32.9)- ?colon CA, cirrhosis, malignancy


-Will need to discuss plan for liver lesions with IR this wk, ?additional bx 

needed


-will need repeat colonoscopy in few wks after d/c as per GI





#Diarrhea, r/o c.diff or alternate etiology


-possible c. diff as pt on IV abx


-F/u stool cx, WBC for gram stain, C.diff tox, Ag





#ADDIS 2/2 dehydration, ACE - resolved


-has resolved, Cr back to baseline


-lisinopril has been held, consider restarting 





#DM


-BGM


-ISS





#F/E/N


IV NS 75 cc/hr


continue to follow lytes


adv to full liq DM diet





#PPX


DVT: hep 5000 sq tid


GI: protonix 40mg IVP qd 





#Dispo


continued monitoring on med-surg


for bx on monday, NPO after MN prior


























Visit type





- Emergency Visit


Emergency Visit: No





- New Patient


This patient is new to me today: Yes


Date on this admission: 07/07/18





- Critical Care


Critical Care patient: No

## 2018-07-07 NOTE — PN
Teaching Attending Note


Name of Resident: Merari rGaham





ATTENDING PHYSICIAN STATEMENT





I saw and evaluated the patient.


I reviewed the resident's note and discussed the case with the resident.


I agree with the resident's findings and plan as documented with exceptions 

below.








SUBJECTIVE:


Patient seen and examined. abdominal pain continues to improve. no nausea, 

vomiting. Has few episodes of diarrhea, non bloody overnight. 





OBJECTIVE:


 Vital Signs











 Period  Temp  Pulse  Resp  BP Sys/Tiwari  Pulse Ox


 


 Last 24 Hr  97.6 F-98.6 F  67-78  18-20  119-137/68-82  98








 Intake & Output











 07/04/18 07/05/18 07/06/18 07/07/18





 23:59 23:59 23:59 23:59


 


Intake Total   1725 1500


 


Balance   1725 1500


 


Weight  203 lb 8 oz  








General: sitting in bed in no acute distress


Abdomen:soft, mild RLQ/LLQ tenderness, no voluntary or involuntary guarding 

today, no rigidity


Extremities: no edema





 Laboratory Results - last 24 hr











  07/06/18 07/06/18 07/06/18





  07:15 07:15 13:28


 


WBC   


 


RBC   


 


Hgb   


 


Hct   


 


MCV   


 


MCH   


 


MCHC   


 


RDW   


 


Plt Count   


 


MPV   


 


Absolute Neuts (auto)   


 


Neutrophils %   


 


Lymphocytes %   


 


Monocytes %   


 


Eosinophils %   


 


Basophils %   


 


Nucleated RBC %   


 


PT with INR   


 


INR   


 


PTT (Actin FS)   


 


Sodium   


 


Potassium   


 


Chloride   


 


Carbon Dioxide   


 


Anion Gap   


 


BUN   


 


Creatinine   


 


Creat Clearance w eGFR   


 


POC Glucometer   


 


Random Glucose  113 H D  


 


Calcium   


 


Phosphorus   


 


Magnesium   


 


Total Bilirubin   


 


AST   


 


ALT   


 


Alkaline Phosphatase   


 


Total Protein   


 


Albumin   


 


Tumor Marker AFP    2.8


 


Carcinoembryonic Ag    32.9 H


 


CA 19-9 Antigen    16


 


 Antigen    14.4


 


Prostate Specific Ag   0.30 














  07/06/18 07/06/18 07/07/18





  17:07 21:27 06:16


 


WBC   


 


RBC   


 


Hgb   


 


Hct   


 


MCV   


 


MCH   


 


MCHC   


 


RDW   


 


Plt Count   


 


MPV   


 


Absolute Neuts (auto)   


 


Neutrophils %   


 


Lymphocytes %   


 


Monocytes %   


 


Eosinophils %   


 


Basophils %   


 


Nucleated RBC %   


 


PT with INR   


 


INR   


 


PTT (Actin FS)   


 


Sodium   


 


Potassium   


 


Chloride   


 


Carbon Dioxide   


 


Anion Gap   


 


BUN   


 


Creatinine   


 


Creat Clearance w eGFR   


 


POC Glucometer  196  112  129


 


Random Glucose   


 


Calcium   


 


Phosphorus   


 


Magnesium   


 


Total Bilirubin   


 


AST   


 


ALT   


 


Alkaline Phosphatase   


 


Total Protein   


 


Albumin   


 


Tumor Marker AFP   


 


Carcinoembryonic Ag   


 


CA 19-9 Antigen   


 


 Antigen   


 


Prostate Specific Ag   














  07/07/18 07/07/18 07/07/18





  07:45 07:45 07:45


 


WBC  6.0  


 


RBC  4.00  


 


Hgb  12.9  


 


Hct  37.2  


 


MCV  93.0  


 


MCH  32.2  


 


MCHC  34.6  


 


RDW  12.9  


 


Plt Count  231  


 


MPV  7.4 L  


 


Absolute Neuts (auto)  3.9  


 


Neutrophils %  64.8  


 


Lymphocytes %  24.2  D  


 


Monocytes %  8.3  


 


Eosinophils %  2.0  D  


 


Basophils %  0.7  


 


Nucleated RBC %  0  


 


PT with INR    15.30 H


 


INR    1.35 H


 


PTT (Actin FS)   


 


Sodium   140 


 


Potassium   4.3 


 


Chloride   106 


 


Carbon Dioxide   26 


 


Anion Gap   8 


 


BUN   12 


 


Creatinine   1.1 


 


Creat Clearance w eGFR   > 60 


 


POC Glucometer   


 


Random Glucose   144 H D 


 


Calcium   8.7 


 


Phosphorus   2.7 


 


Magnesium   2.1  D 


 


Total Bilirubin   0.7 


 


AST   28  D 


 


ALT   29 


 


Alkaline Phosphatase   120 H D 


 


Total Protein   6.9 


 


Albumin   3.4 


 


Tumor Marker AFP   


 


Carcinoembryonic Ag   


 


CA 19-9 Antigen   


 


 Antigen   


 


Prostate Specific Ag   














  07/07/18 07/07/18





  07:45 11:06


 


WBC  


 


RBC  


 


Hgb  


 


Hct  


 


MCV  


 


MCH  


 


MCHC  


 


RDW  


 


Plt Count  


 


MPV  


 


Absolute Neuts (auto)  


 


Neutrophils %  


 


Lymphocytes %  


 


Monocytes %  


 


Eosinophils %  


 


Basophils %  


 


Nucleated RBC %  


 


PT with INR  


 


INR  


 


PTT (Actin FS)  27.8 


 


Sodium  


 


Potassium  


 


Chloride  


 


Carbon Dioxide  


 


Anion Gap  


 


BUN  


 


Creatinine  


 


Creat Clearance w eGFR  


 


POC Glucometer   165


 


Random Glucose  


 


Calcium  


 


Phosphorus  


 


Magnesium  


 


Total Bilirubin  


 


AST  


 


ALT  


 


Alkaline Phosphatase  


 


Total Protein  


 


Albumin  


 


Tumor Marker AFP  


 


Carcinoembryonic Ag  


 


CA 19-9 Antigen  


 


 Antigen  


 


Prostate Specific Ag  














ASSESSMENT AND PLAN:


73 yom with PMHx of HTN, HLD, NIDDM, colonic polyps admitted with acute 

uncomplicated sigmoid diverticulitis and found with hepatic lesions concerning 

for neoplastic disease and left lung nodule.





-Acute uncomplicated sigmoid diverticulitis


-ADDIS, likely dehdyration with ongoing ACEi use


-Lactic acidosis, likely dehydration+/- metformin use


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 

mass vs colon origin, ?prostatic origin


-HTN


-HLD


-NIDDM





Plan:


levaquin/flayl day 3, Advance to full liquid, taper morphine. Surgery input 

appreciated. GI input noted.CEA noted. WIll likely need colonoscopy, timing per 

GI. 


Pulmonary input noted, Lung biopsy on Monday. 


Follow up oncology input.


2D echo noted.


ISS. Hold lisinopril/metformin. resume statin once tolerating po. 


DVTPPX with heparin


Dispo pending above. 


Plan discussed with patient in detail, all questions answered.

## 2018-07-07 NOTE — PN
Progress Note (short form)





- Note


Progress Note: 


OOB to chair.  Abdominal symptoms are much better. 


No CP or SOB. 





 Intake & Output











 07/04/18 07/05/18 07/06/18 07/07/18





 23:59 23:59 23:59 23:59


 


Intake Total   1725 1500


 


Balance   1725 1500


 


Weight  203 lb 8 oz  








 Last Vital Signs











Temp Pulse Resp BP Pulse Ox


 


 97.6 F   67   20   119/73   98 


 


 07/07/18 06:13  07/07/18 06:13  07/07/18 06:13  07/07/18 06:13  07/06/18 21:00








Active Medications





Acetaminophen (Ofirmev Injection -)  1,000 mg IVPB Q6H PRN


   PRN Reason: FEVER


Heparin Sodium (Porcine) (Heparin -)  5,000 unit SQ TID Replaced by [facility]


   Last Admin: 07/07/18 06:17 Dose:  5,000 unit


Metronidazole (Flagyl 500mg Premixed Ivpb -)  500 mg in 100 mls @ 100 mls/hr 

IVPB Q8H-IV MARK


   Last Admin: 07/07/18 10:15 Dose:  100 mls/hr


Levofloxacin (Levaquin 500 Mg Premixed Ivpb -)  500 mg in 100 mls @ 100 mls/hr 

IVPB DAILY Replaced by [facility]; Protocol


   Last Admin: 07/06/18 10:46 Dose:  100 mls/hr


Sodium Chloride (Normal Saline -)  1,000 mls @ 75 mls/hr IV ASDIR MARK


   Last Admin: 07/07/18 10:14 Dose:  75 mls/hr


Insulin Aspart (Novolog Vial Sliding Scale -)  1 vial SQ ACHS Replaced by [facility]; Protocol


   Last Admin: 07/07/18 11:19 Dose:  1 unit


Morphine Sulfate (Morphine Sulfate)  1 mg IVPUSH Q6H PRN


   PRN Reason: PAIN LEVEL 6-10


Pantoprazole Sodium (Protonix Iv)  40 mg IVPUSH DAILY Replaced by [facility]


   Last Admin: 07/07/18 10:14 Dose:  40 mg





Constitutional: Yes: NAD 


Eyes: Yes: EOM Intact


HENT: Yes: Normocephalic


Neck: Yes: Trachea Midline


Cardiovascular: Yes: Regular Rate and Rhythm


Respiratory: Yes: CTA Bilaterally


Gastrointestinal: Yes: Abdomen, Obese


Edema: No


Integumentary: Yes: WNL


Neurological: Yes: WNL


Labs: 


  Laboratory Results - last 24 hr











  07/06/18 07/06/18 07/06/18





  07:15 07:15 12:06


 


WBC   


 


RBC   


 


Hgb   


 


Hct   


 


MCV   


 


MCH   


 


MCHC   


 


RDW   


 


Plt Count   


 


MPV   


 


Absolute Neuts (auto)   


 


Neutrophils %   


 


Lymphocytes %   


 


Monocytes %   


 


Eosinophils %   


 


Basophils %   


 


Nucleated RBC %   


 


PT with INR   


 


INR   


 


PTT (Actin FS)   


 


Sodium   


 


Potassium   


 


Chloride   


 


Carbon Dioxide   


 


Anion Gap   


 


BUN   


 


Creatinine   


 


Creat Clearance w eGFR   


 


POC Glucometer    115


 


Random Glucose  113 H D  


 


Calcium   


 


Phosphorus   


 


Magnesium   


 


Total Bilirubin   


 


AST   


 


ALT   


 


Alkaline Phosphatase   


 


Total Protein   


 


Albumin   


 


Tumor Marker AFP   


 


Carcinoembryonic Ag   


 


CA 19-9 Antigen   


 


 Antigen   


 


Prostate Specific Ag   0.30 














  07/06/18 07/06/18 07/06/18





  13:28 17:07 21:27


 


WBC   


 


RBC   


 


Hgb   


 


Hct   


 


MCV   


 


MCH   


 


MCHC   


 


RDW   


 


Plt Count   


 


MPV   


 


Absolute Neuts (auto)   


 


Neutrophils %   


 


Lymphocytes %   


 


Monocytes %   


 


Eosinophils %   


 


Basophils %   


 


Nucleated RBC %   


 


PT with INR   


 


INR   


 


PTT (Actin FS)   


 


Sodium   


 


Potassium   


 


Chloride   


 


Carbon Dioxide   


 


Anion Gap   


 


BUN   


 


Creatinine   


 


Creat Clearance w eGFR   


 


POC Glucometer   196  112


 


Random Glucose   


 


Calcium   


 


Phosphorus   


 


Magnesium   


 


Total Bilirubin   


 


AST   


 


ALT   


 


Alkaline Phosphatase   


 


Total Protein   


 


Albumin   


 


Tumor Marker AFP  2.8  


 


Carcinoembryonic Ag  32.9 H  


 


CA 19-9 Antigen  16  


 


 Antigen  14.4  


 


Prostate Specific Ag   














  07/07/18 07/07/18 07/07/18





  06:16 07:45 07:45


 


WBC   6.0 


 


RBC   4.00 


 


Hgb   12.9 


 


Hct   37.2 


 


MCV   93.0 


 


MCH   32.2 


 


MCHC   34.6 


 


RDW   12.9 


 


Plt Count   231 


 


MPV   7.4 L 


 


Absolute Neuts (auto)   3.9 


 


Neutrophils %   64.8 


 


Lymphocytes %   24.2  D 


 


Monocytes %   8.3 


 


Eosinophils %   2.0  D 


 


Basophils %   0.7 


 


Nucleated RBC %   0 


 


PT with INR   


 


INR   


 


PTT (Actin FS)   


 


Sodium    140


 


Potassium    4.3


 


Chloride    106


 


Carbon Dioxide    26


 


Anion Gap    8


 


BUN    12


 


Creatinine    1.1


 


Creat Clearance w eGFR    > 60


 


POC Glucometer  129  


 


Random Glucose    144 H D


 


Calcium    8.7


 


Phosphorus    2.7


 


Magnesium    2.1  D


 


Total Bilirubin    0.7


 


AST    28  D


 


ALT    29


 


Alkaline Phosphatase    120 H D


 


Total Protein    6.9


 


Albumin    3.4


 


Tumor Marker AFP   


 


Carcinoembryonic Ag   


 


CA 19-9 Antigen   


 


 Antigen   


 


Prostate Specific Ag   














  07/07/18 07/07/18 07/07/18





  07:45 07:45 11:06


 


WBC   


 


RBC   


 


Hgb   


 


Hct   


 


MCV   


 


MCH   


 


MCHC   


 


RDW   


 


Plt Count   


 


MPV   


 


Absolute Neuts (auto)   


 


Neutrophils %   


 


Lymphocytes %   


 


Monocytes %   


 


Eosinophils %   


 


Basophils %   


 


Nucleated RBC %   


 


PT with INR  15.30 H  


 


INR  1.35 H  


 


PTT (Actin FS)   27.8 


 


Sodium   


 


Potassium   


 


Chloride   


 


Carbon Dioxide   


 


Anion Gap   


 


BUN   


 


Creatinine   


 


Creat Clearance w eGFR   


 


POC Glucometer    165


 


Random Glucose   


 


Calcium   


 


Phosphorus   


 


Magnesium   


 


Total Bilirubin   


 


AST   


 


ALT   


 


Alkaline Phosphatase   


 


Total Protein   


 


Albumin   


 


Tumor Marker AFP   


 


Carcinoembryonic Ag   


 


CA 19-9 Antigen   


 


 Antigen   


 


Prostate Specific Ag   











Problem List





- Problems


(1) Liver lesion


Code(s): K76.9 - LIVER DISEASE, UNSPECIFIED   





(2) Lung nodule


Code(s): R91.1 - SOLITARY PULMONARY NODULE   





(3) Sigmoid diverticulitis


Code(s): K57.32 - DVTRCLI OF LG INT W/O PERFORATION OR ABSCESS W/O BLEEDING   





Assessment/Plan


INCIDENTAL FINDING OF LUNG MASS ON CT CHEST W POSSIBLE LIVER LESIONS


RESOLVING SIGMOID DIVERTICULITIS





TRANS-THORACIC NEEDLE BIOPSY WILL BE SCHEDULED FOR MONDAY


NO SMOKING 


O2 AS NEEDED





DR MELGAR

## 2018-07-08 LAB
ANION GAP SERPL CALC-SCNC: 8 MMOL/L (ref 8–16)
BASOPHILS # BLD: 0.9 % (ref 0–2)
BUN SERPL-MCNC: 11 MG/DL (ref 7–18)
CALCIUM SERPL-MCNC: 8.7 MG/DL (ref 8.5–10.1)
CHLORIDE SERPL-SCNC: 109 MMOL/L (ref 98–107)
CO2 SERPL-SCNC: 25 MMOL/L (ref 21–32)
CREAT SERPL-MCNC: 1 MG/DL (ref 0.7–1.3)
DEPRECATED RDW RBC AUTO: 13.1 % (ref 11.9–15.9)
EOSINOPHIL # BLD: 2.9 % (ref 0–4.5)
GLUCOSE SERPL-MCNC: 132 MG/DL (ref 74–106)
HCT VFR BLD CALC: 36.3 % (ref 35.4–49)
HGB BLD-MCNC: 12.8 GM/DL (ref 11.7–16.9)
LYMPHOCYTES # BLD: 26.6 % (ref 8–40)
MAGNESIUM SERPL-MCNC: 1.8 MG/DL (ref 1.8–2.4)
MCH RBC QN AUTO: 32.7 PG (ref 25.7–33.7)
MCHC RBC AUTO-ENTMCNC: 35.4 G/DL (ref 32–35.9)
MCV RBC: 92.4 FL (ref 80–96)
MONOCYTES # BLD AUTO: 8.4 % (ref 3.8–10.2)
NEUTROPHILS # BLD: 61.2 % (ref 42.8–82.8)
PHOSPHATE SERPL-MCNC: 2.4 MG/DL (ref 2.5–4.9)
PLATELET # BLD AUTO: 236 K/MM3 (ref 134–434)
PMV BLD: 7.3 FL (ref 7.5–11.1)
POTASSIUM SERPLBLD-SCNC: 4.1 MMOL/L (ref 3.5–5.1)
RBC # BLD AUTO: 3.93 M/MM3 (ref 4–5.6)
SODIUM SERPL-SCNC: 142 MMOL/L (ref 136–145)
WBC # BLD AUTO: 5.6 K/MM3 (ref 4–10)

## 2018-07-08 RX ADMIN — HEPARIN SODIUM SCH UNIT: 5000 INJECTION, SOLUTION INTRAVENOUS; SUBCUTANEOUS at 15:28

## 2018-07-08 RX ADMIN — INSULIN ASPART SCH: 100 INJECTION, SOLUTION INTRAVENOUS; SUBCUTANEOUS at 11:33

## 2018-07-08 RX ADMIN — PANTOPRAZOLE SODIUM SCH MG: 40 INJECTION, POWDER, FOR SOLUTION INTRAVENOUS at 10:07

## 2018-07-08 RX ADMIN — INSULIN ASPART SCH: 100 INJECTION, SOLUTION INTRAVENOUS; SUBCUTANEOUS at 17:14

## 2018-07-08 RX ADMIN — INSULIN ASPART SCH: 100 INJECTION, SOLUTION INTRAVENOUS; SUBCUTANEOUS at 21:30

## 2018-07-08 RX ADMIN — ROSUVASTATIN CALCIUM SCH MG: 20 TABLET, FILM COATED ORAL at 21:29

## 2018-07-08 RX ADMIN — SODIUM CHLORIDE SCH MLS/HR: 9 INJECTION, SOLUTION INTRAVENOUS at 05:56

## 2018-07-08 RX ADMIN — INSULIN ASPART SCH: 100 INJECTION, SOLUTION INTRAVENOUS; SUBCUTANEOUS at 07:35

## 2018-07-08 RX ADMIN — HEPARIN SODIUM SCH UNIT: 5000 INJECTION, SOLUTION INTRAVENOUS; SUBCUTANEOUS at 05:56

## 2018-07-08 NOTE — PN
Physical Exam: 


SUBJECTIVE: Patient seen and examined,almost resolved, abdominal pain, 

tolerating full liquid diet well. 








OBJECTIVE:





 Vital Signs











 Period  Temp  Pulse  Resp  BP Sys/Tiwari  Pulse Ox


 


 Last 24 Hr  97.4 F-98.2 F  62-88  18-20  120-146/73-88  98











GENERAL: sitting in bed in no acute distress


Chest: CTAB, no rales or wheezing


Abdomen:soft, minimal LLQ tendernses, no voluntary or involuntary guarding or 

rigidity, positive bowel sounds


Extremities: no edema











 Laboratory Results - last 24 hr











  07/07/18 07/07/18 07/08/18





  16:51 21:14 05:56


 


WBC   


 


RBC   


 


Hgb   


 


Hct   


 


MCV   


 


MCH   


 


MCHC   


 


RDW   


 


Plt Count   


 


MPV   


 


Absolute Neuts (auto)   


 


Neutrophils %   


 


Lymphocytes %   


 


Monocytes %   


 


Eosinophils %   


 


Basophils %   


 


Nucleated RBC %   


 


Sodium   


 


Potassium   


 


Chloride   


 


Carbon Dioxide   


 


Anion Gap   


 


BUN   


 


Creatinine   


 


Creat Clearance w eGFR   


 


POC Glucometer  113  132  124


 


Random Glucose   


 


Calcium   


 


Phosphorus   


 


Magnesium   














  07/08/18 07/08/18 07/08/18





  07:28 07:28 11:29


 


WBC  5.6  


 


RBC  3.93 L  


 


Hgb  12.8  


 


Hct  36.3  


 


MCV  92.4  


 


MCH  32.7  


 


MCHC  35.4  


 


RDW  13.1  


 


Plt Count  236  


 


MPV  7.3 L  


 


Absolute Neuts (auto)  3.4  


 


Neutrophils %  61.2  


 


Lymphocytes %  26.6  


 


Monocytes %  8.4  


 


Eosinophils %  2.9  


 


Basophils %  0.9  


 


Nucleated RBC %  0  


 


Sodium   142 


 


Potassium   4.1 


 


Chloride   109 H 


 


Carbon Dioxide   25 


 


Anion Gap   8 


 


BUN   11 


 


Creatinine   1.0 


 


Creat Clearance w eGFR   > 60 


 


POC Glucometer    131


 


Random Glucose   132 H 


 


Calcium   8.7 


 


Phosphorus   2.4 L 


 


Magnesium   1.8 








Active Medications











Generic Name Dose Route Start Last Admin





  Trade Name Freq  PRN Reason Stop Dose Admin


 


Heparin Sodium (Porcine)  5,000 unit  07/05/18 22:00  07/08/18 05:56





  Heparin -  SQ   5,000 unit





  TID MARK   Administration





     





     





     





     


 


Metronidazole  500 mg in 100 mls @ 100 mls/hr  07/05/18 18:00  07/08/18 10:07





  Flagyl 500mg Premixed Ivpb -  IVPB   100 mls/hr





  Q8H-IV MARK   Administration





     





     





     





     


 


Levofloxacin  500 mg in 100 mls @ 100 mls/hr  07/06/18 10:00  07/08/18 10:07





  Levaquin 500 Mg Premixed Ivpb -  IVPB   100 mls/hr





  DAILY MARK   Administration





     





     





  Protocol   





     


 


Sodium Chloride  1,000 mls @ 75 mls/hr  07/05/18 16:15  07/08/18 05:56





  Normal Saline -  IV   75 mls/hr





  ASDIR MARK   Administration





     





     





     





     


 


Sodium Phosphate 15 mm/  255 mls @ 62.5 mls/hr  07/08/18 15:12  





  Dextrose  IVPB  07/08/18 19:16  





  ONCE ONE   





     





     





     





     


 


Insulin Aspart  1 vial  07/05/18 16:30  07/08/18 11:33





  Novolog Vial Sliding Scale -  SQ   Not Given





  [facility]   





     





     





  Protocol   





     


 


Magnesium Sulfate  1 gm  07/08/18 15:12  





  Magnesium Sulfate  IVPB  07/08/18 15:13  





  ONCE ONE   





     





     





     





     


 


Pantoprazole Sodium  40 mg  07/06/18 10:00  07/08/18 10:07





  Protonix Iv  IVPUSH   40 mg





  DAILY MARK   Administration





     





     





     





     











ASSESSMENT/PLAN:


73 yom with PMHx of HTN, HLD, NIDDM, colonic polyps admitted with acute 

uncomplicated sigmoid diverticulitis and found with hepatic lesions concerning 

for neoplastic disease and left lung nodule.





-Acute uncomplicated sigmoid diverticulitis


-ADDIS, likely dehdyration with ongoing ACEi use


-Lactic acidosis, likely dehydration+/- metformin use


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 

mass vs colon origin, ?prostatic origin


-HTN


-HLD


-NIDDM





Plan:


levaquin/flayl day 4, Advance to soft diabetic diet, dc morphine. Surgery input 

appreciated. GI input noted.CEA noted. WIll likely need colonoscopy, timing per 

GI. 


Pulmonary input noted, Lung biopsy on Monday. Hold heparin, NPO after midnight. 


Follow up oncology input.


2D echo noted.


ISS. Hold lisinopril/metformin. Resume statin


DVTPPX with heparin


Dispo pending above. 


Plan discussed with patient in detail, all questions answered. 


Dispo anticipate d/c in 24 hours after biopsy if no concerns, eating well on po 

abx with outpatient oncology follow up











Visit type





- Emergency Visit


Emergency Visit: Yes


ED Registration Date: 07/05/18


Care time: The patient presented to the Emergency Department on the above date 

and was hospitalized for further evaluation of their emergent condition.





- New Patient


This patient is new to me today: No





- Critical Care


Critical Care patient: No





- Discharge Referral


Referred to [facility] No

## 2018-07-08 NOTE — PN
Progress Note (short form)





- Note


Progress Note: 


Feels well today. 


Abdominal symptoms are much better. 


No CP or SOB. 





  


 Intake & Output











 07/05/18 07/06/18 07/07/18 07/08/18





 23:59 23:59 23:59 23:59


 


Intake Total  1725 2050 1475


 


Balance  1725 2050 1475


 


Weight 203 lb 8 oz  203 lb 











 Last Vital Signs











Temp Pulse Resp BP Pulse Ox


 


 97.4 F L  70   18   142/80   98 


 


 07/08/18 09:07  07/08/18 09:07  07/08/18 09:07  07/08/18 09:07  07/07/18 21:00








Active Medications





Acetaminophen (Ofirmev Injection -)  1,000 mg IVPB Q6H PRN


   PRN Reason: FEVER


Heparin Sodium (Porcine) (Heparin -)  5,000 unit SQ TID [facility]


   Last Admin: 07/08/18 05:56 Dose:  5,000 unit


Metronidazole (Flagyl 500mg Premixed Ivpb -)  500 mg in 100 mls @ 100 mls/hr 

IVPB Q8H-IV MARK


   Last Admin: 07/08/18 10:07 Dose:  100 mls/hr


Levofloxacin (Levaquin 500 Mg Premixed Ivpb -)  500 mg in 100 mls @ 100 mls/hr 

IVPB DAILY [facility]; Protocol


   Last Admin: 07/08/18 10:07 Dose:  100 mls/hr


Sodium Chloride (Normal Saline -)  1,000 mls @ 75 mls/hr IV ASDIR [facility]


   Last Admin: 07/08/18 05:56 Dose:  75 mls/hr


Insulin Aspart (Novolog Vial Sliding Scale -)  1 vial SQ ACHS [facility]; Protocol


   Last Admin: 07/08/18 11:33 Dose:  Not Given


Morphine Sulfate (Morphine Sulfate)  1 mg IVPUSH Q6H PRN


   PRN Reason: PAIN LEVEL 6-10


Pantoprazole Sodium (Protonix Iv)  40 mg IVPUSH DAILY [facility]


   Last Admin: 07/08/18 10:07 Dose:  40 mg








Constitutional: Yes: NAD 


Eyes: Yes: EOM Intact


HENT: Yes: Normocephalic


Neck: Yes: Trachea Midline


Cardiovascular: Yes: Regular Rate and Rhythm


Respiratory: Yes: CTA Bilaterally


Gastrointestinal: Yes: Abdomen, Obese


Edema: No


Integumentary: Yes: WNL


Neurological: Yes: WNL


Labs: 


  


 Laboratory Results - last 24 hr











  07/07/18 07/07/18 07/08/18





  16:51 21:14 05:56


 


WBC   


 


RBC   


 


Hgb   


 


Hct   


 


MCV   


 


MCH   


 


MCHC   


 


RDW   


 


Plt Count   


 


MPV   


 


Absolute Neuts (auto)   


 


Neutrophils %   


 


Lymphocytes %   


 


Monocytes %   


 


Eosinophils %   


 


Basophils %   


 


Nucleated RBC %   


 


Sodium   


 


Potassium   


 


Chloride   


 


Carbon Dioxide   


 


Anion Gap   


 


BUN   


 


Creatinine   


 


Creat Clearance w eGFR   


 


POC Glucometer  113  132  124


 


Random Glucose   


 


Calcium   


 


Phosphorus   


 


Magnesium   














  07/08/18 07/08/18





  07:28 07:28


 


WBC  5.6 


 


RBC  3.93 L 


 


Hgb  12.8 


 


Hct  36.3 


 


MCV  92.4 


 


MCH  32.7 


 


MCHC  35.4 


 


RDW  13.1 


 


Plt Count  236 


 


MPV  7.3 L 


 


Absolute Neuts (auto)  3.4 


 


Neutrophils %  61.2 


 


Lymphocytes %  26.6 


 


Monocytes %  8.4 


 


Eosinophils %  2.9 


 


Basophils %  0.9 


 


Nucleated RBC %  0 


 


Sodium   142


 


Potassium   4.1


 


Chloride   109 H


 


Carbon Dioxide   25


 


Anion Gap   8


 


BUN   11


 


Creatinine   1.0


 


Creat Clearance w eGFR   > 60


 


POC Glucometer  


 


Random Glucose   132 H


 


Calcium   8.7


 


Phosphorus   2.4 L


 


Magnesium   1.8














Problem List





- Problems


(1) Liver lesion


Code(s): K76.9 - LIVER DISEASE, UNSPECIFIED   





(2) Lung nodule


Code(s): R91.1 - SOLITARY PULMONARY NODULE   





(3) Sigmoid diverticulitis


Code(s): K57.32 - DVTRCLI OF LG INT W/O PERFORATION OR ABSCESS W/O BLEEDING   





Assessment/Plan


INCIDENTAL FINDING OF LUNG MASS ON CT CHEST W POSSIBLE LIVER LESIONS


RESOLVING SIGMOID DIVERTICULITIS





ORDER FOR A TRANS-THORACIC NEEDLE BIOPSY IS IN FOR TOMORROW 


NO SMOKING 


O2 AS NEEDED


PO AS TOLERATED 





DR MELGAR

## 2018-07-09 LAB
ANION GAP SERPL CALC-SCNC: 7 MMOL/L (ref 8–16)
BASOPHILS # BLD: 1 % (ref 0–2)
BUN SERPL-MCNC: 14 MG/DL (ref 7–18)
CALCIUM SERPL-MCNC: 8.8 MG/DL (ref 8.5–10.1)
CHLORIDE SERPL-SCNC: 109 MMOL/L (ref 98–107)
CO2 SERPL-SCNC: 27 MMOL/L (ref 21–32)
CREAT SERPL-MCNC: 1.1 MG/DL (ref 0.7–1.3)
DEPRECATED RDW RBC AUTO: 13.3 % (ref 11.9–15.9)
EOSINOPHIL # BLD: 3 % (ref 0–4.5)
GLUCOSE SERPL-MCNC: 136 MG/DL (ref 74–106)
HCT VFR BLD CALC: 36.4 % (ref 35.4–49)
HGB BLD-MCNC: 12.7 GM/DL (ref 11.7–16.9)
LYMPHOCYTES # BLD: 30.9 % (ref 8–40)
MAGNESIUM SERPL-MCNC: 1.9 MG/DL (ref 1.8–2.4)
MCH RBC QN AUTO: 32.3 PG (ref 25.7–33.7)
MCHC RBC AUTO-ENTMCNC: 34.9 G/DL (ref 32–35.9)
MCV RBC: 92.5 FL (ref 80–96)
MONOCYTES # BLD AUTO: 8.2 % (ref 3.8–10.2)
NEUTROPHILS # BLD: 56.9 % (ref 42.8–82.8)
PHOSPHATE SERPL-MCNC: 3 MG/DL (ref 2.5–4.9)
PLATELET # BLD AUTO: 238 K/MM3 (ref 134–434)
PMV BLD: 7.1 FL (ref 7.5–11.1)
POTASSIUM SERPLBLD-SCNC: 4.1 MMOL/L (ref 3.5–5.1)
RBC # BLD AUTO: 3.94 M/MM3 (ref 4–5.6)
SODIUM SERPL-SCNC: 143 MMOL/L (ref 136–145)
WBC # BLD AUTO: 4.9 K/MM3 (ref 4–10)

## 2018-07-09 PROCEDURE — 0F903ZX DRAINAGE OF LIVER, PERCUTANEOUS APPROACH, DIAGNOSTIC: ICD-10-PCS | Performed by: RADIOLOGY

## 2018-07-09 RX ADMIN — PANTOPRAZOLE SODIUM SCH MG: 40 INJECTION, POWDER, FOR SOLUTION INTRAVENOUS at 09:59

## 2018-07-09 RX ADMIN — INSULIN ASPART SCH: 100 INJECTION, SOLUTION INTRAVENOUS; SUBCUTANEOUS at 22:07

## 2018-07-09 RX ADMIN — SODIUM CHLORIDE SCH MLS/HR: 9 INJECTION, SOLUTION INTRAVENOUS at 09:58

## 2018-07-09 RX ADMIN — INSULIN ASPART SCH: 100 INJECTION, SOLUTION INTRAVENOUS; SUBCUTANEOUS at 06:20

## 2018-07-09 RX ADMIN — INSULIN ASPART SCH: 100 INJECTION, SOLUTION INTRAVENOUS; SUBCUTANEOUS at 16:47

## 2018-07-09 RX ADMIN — ROSUVASTATIN CALCIUM SCH MG: 20 TABLET, FILM COATED ORAL at 22:04

## 2018-07-09 RX ADMIN — INSULIN ASPART SCH: 100 INJECTION, SOLUTION INTRAVENOUS; SUBCUTANEOUS at 11:10

## 2018-07-09 NOTE — PN
Physical Exam: 


SUBJECTIVE: Patient seen and examined. No acute events overnight. Denies Abd. 

pain, CP, and SOB. Pt. endorses some diarrhea after being on a soft diet, but 

denied blood in the stool. Pt. is able to pass urine and ambulates freely. 








OBJECTIVE:





 Vital Signs











 Period  Temp  Pulse  Resp  BP Sys/Tiwair  Pulse Ox


 


 Last 24 Hr  98.0 F-98.8 F  65-84  16-20  139-156/54-99  











GENERAL: The patient is awake, alert, and fully oriented, in no acute distress.


LUNGS: Breath sounds equal, clear to auscultation bilaterally, no wheezes, no 

crackles, no 


accessory muscle use. 


HEART: Regular rate and rhythm, S1, S2 without murmur, rub or gallop.


ABDOMEN: Soft, nontender, nondistended, normoactive bowel sounds, no guarding, 

no 


rebound, no hepatosplenomegaly, no masses.


EXTREMITIES: warm, well-perfused, no edema. 


PSYCH: Normal mood, normal affect.


SKIN: Warm, dry, normal turgor











 Laboratory Results - last 24 hr











  07/08/18 07/09/18 07/09/18





  21:22 06:02 06:40


 


WBC    4.9


 


RBC    3.94 L


 


Hgb    12.7


 


Hct    36.4


 


MCV    92.5


 


MCH    32.3


 


MCHC    34.9


 


RDW    13.3


 


Plt Count    238


 


MPV    7.1 L


 


Absolute Neuts (auto)    2.8


 


Neutrophils %    56.9


 


Lymphocytes %    30.9


 


Monocytes %    8.2


 


Eosinophils %    3.0


 


Basophils %    1.0


 


Nucleated RBC %    0


 


Sodium   


 


Potassium   


 


Chloride   


 


Carbon Dioxide   


 


Anion Gap   


 


BUN   


 


Creatinine   


 


Creat Clearance w eGFR   


 


POC Glucometer  162  135 


 


Random Glucose   


 


Calcium   


 


Phosphorus   


 


Magnesium   














  07/09/18 07/09/18 07/09/18





  06:40 11:08 16:43


 


WBC   


 


RBC   


 


Hgb   


 


Hct   


 


MCV   


 


MCH   


 


MCHC   


 


RDW   


 


Plt Count   


 


MPV   


 


Absolute Neuts (auto)   


 


Neutrophils %   


 


Lymphocytes %   


 


Monocytes %   


 


Eosinophils %   


 


Basophils %   


 


Nucleated RBC %   


 


Sodium  143  


 


Potassium  4.1  


 


Chloride  109 H  


 


Carbon Dioxide  27  


 


Anion Gap  7 L  


 


BUN  14  


 


Creatinine  1.1  


 


Creat Clearance w eGFR  > 60  


 


POC Glucometer   121  124


 


Random Glucose  136 H  


 


Calcium  8.8  


 


Phosphorus  3.0  D  


 


Magnesium  1.9  








Active Medications





 Current Medications





Heparin Sodium (Porcine) (Heparin -)  5,000 unit SQ TID [facility]


   Last Admin: 07/08/18 15:28 Dose:  5,000 unit


Metronidazole (Flagyl 500mg Premixed Ivpb -)  500 mg in 100 mls @ 100 mls/hr 

IVPB Q8H-IV [facility]


   Last Admin: 07/09/18 18:56 Dose:  100 mls/hr


Levofloxacin (Levaquin 500 Mg Premixed Ivpb -)  500 mg in 100 mls @ 100 mls/hr 

IVPB DAILY [facility]; Protocol


   Last Admin: 07/09/18 10:03 Dose:  100 mls/hr


Sodium Chloride (Normal Saline -)  1,000 mls @ 75 mls/hr IV ASDIR [facility]


   Last Admin: 07/09/18 09:58 Dose:  75 mls/hr


Insulin Aspart (Novolog Vial Sliding Scale -)  1 vial SQ ACHS [facility]; Protocol


   Last Admin: 07/09/18 16:47 Dose:  Not Given


Pantoprazole Sodium (Protonix Iv)  40 mg IVPUSH DAILY [facility]


   Last Admin: 07/09/18 09:59 Dose:  40 mg


Rosuvastatin Calcium (Crestor -)  20 mg PO HS [facility]


   Last Admin: 07/08/18 21:29 Dose:  20 mg











ASSESSMENT/PLAN:


The patient is a 74 yo m w/ PMH HTN, DM comes in complaining of lower abdominal 

pain, diarrhea and decreased appetite found to have acute diverticulitis with 

an incidental finding of lung nodule and liver lesions on CT. 





# abdominal pain an diarrhea 2/2 acute uncomplicated diverticulitis


   -confirmed by CT


   -surgery consult   


   -GI consult for colonoscopy


   -NS @ 75


   -c/w IV levaquin and IV flagyl; will continue


   -diet advanced to diabetic clears


   -pain control w/ morphine 2mg q4h and IV tylenol 1g


   -decreased WBC: 10.7--> 8.6


   -no fever


   -CEA: 32.9- elevated


   -c/w protonix 





#Lung nodule and liver lesions


   -heme-onc consult


   -CT chest appreciated


   - lung biopsy to follow outpatient vs. inpatient


   


 #cardiomegaly on CXR


   -echo appreciated: mild Mr, LVF normal, mild Aortic sclerosis, mild pulmonic 

valve regurgitation. 





#ADDIS 2/2 dehydration


   -NS @ 75ml/hr


   -1L bolus


   -monitor creatinine


   - hold lisinopril, BP is wnl 


#FEN


   -NS @75


   -lytes WNL, replete PRN


   -diet advanced to diabetic clears


#DM2


   -BG wnl, c/w glucose monitoring


   -Hold Novolog and Metformin





#VTE PPx.


   -heparin sq 5k units TID





#Dispo


   -admit med surg 





Visit type





- Emergency Visit


Emergency Visit: No





- New Patient


This patient is new to me today: No





- Critical Care


Critical Care patient: No





- Discharge Referral


Referred to [facility] No

## 2018-07-09 NOTE — PN
Progress Note, Physician


History of Present Illness: 





PULMONARY





ALERT,NAD,-SOB.





- Current Medication List


Current Medications: 


Active Medications





Heparin Sodium (Porcine) (Heparin -)  5,000 unit SQ TID [facility]


   Last Admin: 07/08/18 15:28 Dose:  5,000 unit


Metronidazole (Flagyl 500mg Premixed Ivpb -)  500 mg in 100 mls @ 100 mls/hr 

IVPB Q8H-IV [facility]


   Last Admin: 07/09/18 10:03 Dose:  100 mls/hr


Levofloxacin (Levaquin 500 Mg Premixed Ivpb -)  500 mg in 100 mls @ 100 mls/hr 

IVPB DAILY [facility]; Protocol


   Last Admin: 07/09/18 10:03 Dose:  100 mls/hr


Sodium Chloride (Normal Saline -)  1,000 mls @ 75 mls/hr IV ASDIR MARK


   Last Admin: 07/09/18 09:58 Dose:  75 mls/hr


Insulin Aspart (Novolog Vial Sliding Scale -)  1 vial SQ ACHS [facility]; Protocol


   Last Admin: 07/09/18 11:10 Dose:  Not Given


Pantoprazole Sodium (Protonix Iv)  40 mg IVPUSH DAILY [facility]


   Last Admin: 07/09/18 09:59 Dose:  40 mg


Rosuvastatin Calcium (Crestor -)  20 mg PO HS MARK


   Last Admin: 07/08/18 21:29 Dose:  20 mg











- Objective


Vital Signs: 


 Vital Signs











Temperature  98.8 F   07/09/18 10:16


 


Pulse Rate  67   07/09/18 10:16


 


Respiratory Rate  20   07/09/18 10:16


 


Blood Pressure  146/54   07/09/18 10:16


 


O2 Sat by Pulse Oximetry (%)  98   07/08/18 21:00











Constitutional: Yes: Well Nourished, Calm


Eyes: Yes: WNL


HENT: Yes: WNL


Neck: Yes: WNL


Cardiovascular: Yes: Regular Rate and Rhythm, S1, S2


Respiratory: Yes: CTA Bilaterally


Gastrointestinal: Yes: Normal Bowel Sounds, Soft


Extremities: Yes: WNL


Edema: No


Labs: 


 CBC, BMP





 07/09/18 06:40 





 07/09/18 06:40 





 INR, PTT











INR  1.35  (0.82-1.09)  H  07/07/18  07:45    














Assessment/Plan








Problem List





- Problems


(1) Liver lesion


Code(s): K76.9 - LIVER DISEASE, UNSPECIFIED   





(2) Lung nodule


Code(s): R91.1 - SOLITARY PULMONARY NODULE   





(3) Sigmoid diverticulitis


Code(s): K57.32 - DVTRCLI OF LG INT W/O PERFORATION OR ABSCESS W/O BLEEDING   





Assessment/Plan


INCIDENTAL FINDING OF LUNG MASS ON CT CHEST W POSSIBLE LIVER LESIONS


RESOLVING SIGMOID DIVERTICULITIS





LIVER BX


NO SMOKING 


O2 AS NEEDED








DR BARRERA

## 2018-07-09 NOTE — PN
Progress Note, Physician





- Current Medication List


Current Medications: 


Active Medications





Acetaminophen (Tylenol -)  325 mg PO ONCE ONE


   Stop: 07/09/18 16:16


Acetaminophen (Tylenol -)  650 mg PO ONCE ONE


   Stop: 07/09/18 16:16


Heparin Sodium (Porcine) (Heparin -)  5,000 unit SQ TID MARK


   Last Admin: 07/08/18 15:28 Dose:  5,000 unit


Metronidazole (Flagyl 500mg Premixed Ivpb -)  500 mg in 100 mls @ 100 mls/hr 

IVPB Q8H-IV MARK


   Last Admin: 07/09/18 10:03 Dose:  100 mls/hr


Levofloxacin (Levaquin 500 Mg Premixed Ivpb -)  500 mg in 100 mls @ 100 mls/hr 

IVPB DAILY MARK; Protocol


   Last Admin: 07/09/18 10:03 Dose:  100 mls/hr


Sodium Chloride (Normal Saline -)  1,000 mls @ 75 mls/hr IV ASDIR MARK


   Last Admin: 07/09/18 09:58 Dose:  75 mls/hr


Insulin Aspart (Novolog Vial Sliding Scale -)  1 vial SQ ACHS MARK; Protocol


   Last Admin: 07/09/18 11:10 Dose:  Not Given


Oxycodone HCl (Roxicodone -)  5 mg PO ONCE ONE


   Stop: 07/09/18 16:16


Oxycodone HCl (Roxicodone -)  10 mg PO ONCE ONE


   Stop: 07/09/18 16:16


Pantoprazole Sodium (Protonix Iv)  40 mg IVPUSH DAILY [facility]


   Last Admin: 07/09/18 09:59 Dose:  40 mg


Rosuvastatin Calcium (Crestor -)  20 mg PO HS MARK


   Last Admin: 07/08/18 21:29 Dose:  20 mg











- Objective


Vital Signs: 


 Vital Signs











Temperature  98.4 F   07/09/18 16:05


 


Pulse Rate  67   07/09/18 16:05


 


Respiratory Rate  20   07/09/18 16:05


 


Blood Pressure  149/95   07/09/18 16:05


 


O2 Sat by Pulse Oximetry (%)  98   07/09/18 16:05











Labs: 


 CBC, BMP





 07/09/18 06:40 





 07/09/18 06:40 





 INR, PTT











INR  1.35  (0.82-1.09)  H  07/07/18  07:45    














Problem List





- Problems


(1) Abdominal pain


Code(s): R10.9 - UNSPECIFIED ABDOMINAL PAIN   


Qualifiers: 


   Abdominal location: lower abdomen, unspecified   Qualified Code(s): R10.30 - 

Lower abdominal pain, unspecified   





(2) Sigmoid diverticulitis


Code(s): K57.32 - DVTRCLI OF LG INT W/O PERFORATION OR ABSCESS W/O BLEEDING   





(3) Abnormal liver diagnostic imaging


Code(s): R93.2 - ABNORMAL FINDINGS ON DX IMAGING OF LIVER AND BILIARY TRACT   





(4) Abnormal finding on lung imaging


Code(s): R91.8 - OTHER NONSPECIFIC ABNORMAL FINDING OF LUNG FIELD   





(5) Kidney dysfunction


Code(s): N28.9 - DISORDER OF KIDNEY AND URETER, UNSPECIFIED   





Assessment/Plan





  Surgery:


 Patient denies abdominal pain.


 Had a liver biopsy today.


 Lung biopsy to follow.


 Abdomen is soft , not distended , not tender.


 Has been tolerating diet.

## 2018-07-09 NOTE — PN
Teaching Attending Note


Name of Resident: Yazan Rankin





ATTENDING PHYSICIAN STATEMENT





I saw and evaluated the patient.


I reviewed the resident's note and discussed the case with the resident.


I agree with the resident's findings and plan as documented with exceptions 

below.








SUBJECTIVE:


Patient seen and examined. Abdominal pain almost resolved. came from biopsy 

recently, no new concerns. 





OBJECTIVE:


 Vital Signs











 Period  Temp  Pulse  Resp  BP Sys/Tiwari  Pulse Ox


 


 Last 24 Hr  98.0 F-98.8 F  65-84  16-20  139-156/54-99  








 Intake & Output











 07/06/18 07/07/18 07/08/18 07/09/18





 23:59 23:59 23:59 23:59


 


Intake Total 1725 2050 3475 1125


 


Output Total    300


 


Balance 1725 2050 3475 825


 


Weight  203 lb  








GEneral: sitting in bed in no acute distress


Chest: CTAB, no rales or wheezing


Abdomen:soft, minimal to none LLQ ,no voluntary or involuntary guarding or 

rigidity, positive bowel sounds


Extremities: no edema





 Home Medications











 Medication  Instructions  Recorded


 


Lisinopril 20 mg PO DAILY 07/05/18


 


Metformin HCl [Metformin HCl ER] 500 mg PO BID 07/05/18


 


Multivitamin [Multiple Vitamins] 1 each PO DAILY 07/05/18


 


Rosuvastatin [Crestor -] 20 mg PO DAILY 07/05/18








Active Medications





Heparin Sodium (Porcine) (Heparin -)  5,000 unit SQ TID [facility]


   Last Admin: 07/08/18 15:28 Dose:  5,000 unit


Metronidazole (Flagyl 500mg Premixed Ivpb -)  500 mg in 100 mls @ 100 mls/hr 

IVPB Q8H-IV [facility]


   Last Admin: 07/09/18 10:03 Dose:  100 mls/hr


Levofloxacin (Levaquin 500 Mg Premixed Ivpb -)  500 mg in 100 mls @ 100 mls/hr 

IVPB DAILY [facility]; Protocol


   Last Admin: 07/09/18 10:03 Dose:  100 mls/hr


Sodium Chloride (Normal Saline -)  1,000 mls @ 75 mls/hr IV [facility]IR [facility]


   Last Admin: 07/09/18 09:58 Dose:  75 mls/hr


Insulin Aspart (Novolog Vial Sliding Scale -)  1 vial SQ ACHS [facility]; Protocol


   Last Admin: 07/09/18 16:47 Dose:  Not Given


Pantoprazole Sodium (Protonix Iv)  40 mg IVPUSH DAILY [facility]


   Last Admin: 07/09/18 09:59 Dose:  40 mg


Rosuvastatin Calcium (Crestor -)  20 mg PO HS [facility]


   Last Admin: 07/08/18 21:29 Dose:  20 mg





 Laboratory Results - last 24 hr











  07/08/18 07/08/18 07/09/18





  17:01 21:22 06:02


 


WBC   


 


RBC   


 


Hgb   


 


Hct   


 


MCV   


 


MCH   


 


MCHC   


 


RDW   


 


Plt Count   


 


MPV   


 


Absolute Neuts (auto)   


 


Neutrophils %   


 


Lymphocytes %   


 


Monocytes %   


 


Eosinophils %   


 


Basophils %   


 


Nucleated RBC %   


 


Sodium   


 


Potassium   


 


Chloride   


 


Carbon Dioxide   


 


Anion Gap   


 


BUN   


 


Creatinine   


 


Creat Clearance w eGFR   


 


POC Glucometer  123  162  135


 


Random Glucose   


 


Calcium   


 


Phosphorus   


 


Magnesium   














  07/09/18 07/09/18 07/09/18





  06:40 06:40 11:08


 


WBC  4.9  


 


RBC  3.94 L  


 


Hgb  12.7  


 


Hct  36.4  


 


MCV  92.5  


 


MCH  32.3  


 


MCHC  34.9  


 


RDW  13.3  


 


Plt Count  238  


 


MPV  7.1 L  


 


Absolute Neuts (auto)  2.8  


 


Neutrophils %  56.9  


 


Lymphocytes %  30.9  


 


Monocytes %  8.2  


 


Eosinophils %  3.0  


 


Basophils %  1.0  


 


Nucleated RBC %  0  


 


Sodium   143 


 


Potassium   4.1 


 


Chloride   109 H 


 


Carbon Dioxide   27 


 


Anion Gap   7 L 


 


BUN   14 


 


Creatinine   1.1 


 


Creat Clearance w eGFR   > 60 


 


POC Glucometer    121


 


Random Glucose   136 H 


 


Calcium   8.8 


 


Phosphorus   3.0  D 


 


Magnesium   1.9 














  07/09/18





  16:43


 


WBC 


 


RBC 


 


Hgb 


 


Hct 


 


MCV 


 


MCH 


 


MCHC 


 


RDW 


 


Plt Count 


 


MPV 


 


Absolute Neuts (auto) 


 


Neutrophils % 


 


Lymphocytes % 


 


Monocytes % 


 


Eosinophils % 


 


Basophils % 


 


Nucleated RBC % 


 


Sodium 


 


Potassium 


 


Chloride 


 


Carbon Dioxide 


 


Anion Gap 


 


BUN 


 


Creatinine 


 


Creat Clearance w eGFR 


 


POC Glucometer  124


 


Random Glucose 


 


Calcium 


 


Phosphorus 


 


Magnesium 








 Microbiology





07/05/18 16:30   Blood - Peripheral Venous   Blood Culture - Preliminary


                            NO GROWTH OBTAINED AFTER 96 HOURS, INCUBATION TO 

CONTINUE


                            FOR 1 DAYS.


07/05/18 16:30   Blood - Peripheral Venous   Blood Culture - Preliminary


                            NO GROWTH OBTAINED AFTER 96 HOURS, INCUBATION TO 

CONTINUE


                            FOR 1 DAYS.


07/07/18 18:30   Stool   Salmonella/Shigella Culture - Preliminary


                            NO ENTERIC PATHOGENS, 24 HOURS, ON PRIMARY PLATES


07/07/18 18:30   Stool   Yersinia Culture - Preliminary


                            NO ENTERIC PATHOGENS, 24 HOURS, ON PRIMARY PLATES


07/07/18 18:30   Stool   Vibrio Culture - Final


                            NO GROWTH OF VIBRIO SPECIES OBTAINED


07/07/18 18:30   Stool   Escherichia coli 0157 Culture - Final


                            NO GROWTH OF E COLI 0157 OBTAINED


07/07/18 18:30   Stool   Gram Stain - Final


07/07/18 18:30   Stool   Clostridium difficile Antigen (NARESH) - Final


07/07/18 18:30   Stool   Clostridium difficile Toxin Assay - Final











ASSESSMENT AND PLAN:


73 yom with PMHx of HTN, HLD, NIDDM, colonic polyps admitted with acute 

uncomplicated sigmoid diverticulitis and found with hepatic lesions concerning 

for neoplastic disease and left lung nodule.





-Acute uncomplicated sigmoid diverticulitis


-ADDIS, likely dehdyration with ongoing ACEi use


-Lactic acidosis, likely dehydration+/- metformin use


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 

mass vs colon origin, ?prostatic origin


-HTN


-HLD


-NIDDM





Plan:


levaquin/flayl day 5, transition to PO on d/c for a 10 day course.


Soft diabetic diet, off morphine. 


Surgery input appreciated. GI input noted.CEA noted. Colonoscopy per GI. 


Pulmonary input noted, 


s/p liver biopsy today


Follow up oncology input.


2D echo noted.


ISS. Hold lisinopril/metformin. Continue statin


DVTPPX with heparin


Dispo d/c home in 24 hours with outpatient oncology/GI follow up.


Plan discussed with patient in detail, all questions answered.

## 2018-07-10 VITALS — SYSTOLIC BLOOD PRESSURE: 141 MMHG | DIASTOLIC BLOOD PRESSURE: 87 MMHG

## 2018-07-10 VITALS — TEMPERATURE: 98.6 F | HEART RATE: 80 BPM

## 2018-07-10 LAB
ALBUMIN SERPL-MCNC: 3.5 G/DL (ref 3.4–5)
ALP SERPL-CCNC: 158 U/L (ref 45–117)
ALT SERPL-CCNC: 97 U/L (ref 12–78)
ANION GAP SERPL CALC-SCNC: 10 MMOL/L (ref 8–16)
AST SERPL-CCNC: 97 U/L (ref 15–37)
BASOPHILS # BLD: 0.5 % (ref 0–2)
BILIRUB SERPL-MCNC: 0.4 MG/DL (ref 0.2–1)
BUN SERPL-MCNC: 12 MG/DL (ref 7–18)
CALCIUM SERPL-MCNC: 9 MG/DL (ref 8.5–10.1)
CHLORIDE SERPL-SCNC: 107 MMOL/L (ref 98–107)
CO2 SERPL-SCNC: 23 MMOL/L (ref 21–32)
CREAT SERPL-MCNC: 1.1 MG/DL (ref 0.7–1.3)
DEPRECATED RDW RBC AUTO: 13.6 % (ref 11.9–15.9)
EOSINOPHIL # BLD: 1 % (ref 0–4.5)
GLUCOSE SERPL-MCNC: 133 MG/DL (ref 74–106)
HCT VFR BLD CALC: 37.6 % (ref 35.4–49)
HGB BLD-MCNC: 13.2 GM/DL (ref 11.7–16.9)
LYMPHOCYTES # BLD: 19.7 % (ref 8–40)
MAGNESIUM SERPL-MCNC: 1.9 MG/DL (ref 1.8–2.4)
MCH RBC QN AUTO: 32.1 PG (ref 25.7–33.7)
MCHC RBC AUTO-ENTMCNC: 35 G/DL (ref 32–35.9)
MCV RBC: 91.8 FL (ref 80–96)
MONOCYTES # BLD AUTO: 6.4 % (ref 3.8–10.2)
NEUTROPHILS # BLD: 72.4 % (ref 42.8–82.8)
PHOSPHATE SERPL-MCNC: 3 MG/DL (ref 2.5–4.9)
PLATELET # BLD AUTO: 252 K/MM3 (ref 134–434)
PMV BLD: 7 FL (ref 7.5–11.1)
POTASSIUM SERPLBLD-SCNC: 4 MMOL/L (ref 3.5–5.1)
PROT SERPL-MCNC: 6.7 G/DL (ref 6.4–8.2)
RBC # BLD AUTO: 4.1 M/MM3 (ref 4–5.6)
SODIUM SERPL-SCNC: 140 MMOL/L (ref 136–145)
WBC # BLD AUTO: 7 K/MM3 (ref 4–10)

## 2018-07-10 RX ADMIN — INSULIN ASPART SCH: 100 INJECTION, SOLUTION INTRAVENOUS; SUBCUTANEOUS at 17:16

## 2018-07-10 RX ADMIN — HEPARIN SODIUM SCH UNIT: 5000 INJECTION, SOLUTION INTRAVENOUS; SUBCUTANEOUS at 05:23

## 2018-07-10 RX ADMIN — INSULIN ASPART SCH: 100 INJECTION, SOLUTION INTRAVENOUS; SUBCUTANEOUS at 11:35

## 2018-07-10 RX ADMIN — INSULIN ASPART SCH: 100 INJECTION, SOLUTION INTRAVENOUS; SUBCUTANEOUS at 06:21

## 2018-07-10 RX ADMIN — PANTOPRAZOLE SODIUM SCH MG: 40 INJECTION, POWDER, FOR SOLUTION INTRAVENOUS at 11:35

## 2018-07-10 RX ADMIN — HEPARIN SODIUM SCH UNIT: 5000 INJECTION, SOLUTION INTRAVENOUS; SUBCUTANEOUS at 13:22

## 2018-07-10 NOTE — PN
Progress Note (short form)





- Note


Progress Note: 


Feels well today.  Some mild discomfort at the site of the liver biopsy. 


No CP or SOB. 





  


  


 Intake & Output











 07/07/18 07/08/18 07/09/18 07/10/18





 23:59 23:59 23:59 23:59


 


Intake Total 2050 3475 2225 50


 


Output Total   600 


 


Balance 2050 3475 1625 50


 


Weight 203 lb   











 Last Vital Signs











Temp Pulse Resp BP Pulse Ox


 


 98.5 F   77   20   154/94   98 


 


 07/10/18 06:00  07/10/18 06:00  07/10/18 06:00  07/10/18 06:00  07/09/18 16:05








Active Medications





Acetaminophen (Tylenol -)  650 mg PO Q4H PRN


   PRN Reason: PAIN LEVEL 1-5


Heparin Sodium (Porcine) (Heparin -)  5,000 unit SQ TID [facility]


   Last Admin: 07/10/18 05:23 Dose:  5,000 unit


Metronidazole (Flagyl 500mg Premixed Ivpb -)  500 mg in 100 mls @ 100 mls/hr 

IVPB Q8H-IV [facility]


   Last Admin: 07/10/18 10:09 Dose:  100 mls/hr


Levofloxacin (Levaquin 500 Mg Premixed Ivpb -)  500 mg in 100 mls @ 100 mls/hr 

IVPB DAILY [facility]; Protocol


   Last Admin: 07/10/18 11:33 Dose:  100 mls/hr


Insulin Aspart (Novolog Vial Sliding Scale -)  1 vial SQ ACHS [facility]; Protocol


   Last Admin: 07/10/18 11:35 Dose:  Not Given


Lisinopril (Prinivil)  20 mg PO DAILY [facility]


   Last Admin: 07/10/18 11:28 Dose:  20 mg


Oxycodone HCl (Roxicodone -)  5 mg PO Q6H PRN


   PRN Reason: PAIN LEVEL 6-10


   Last Admin: 07/10/18 11:28 Dose:  5 mg


Pantoprazole Sodium (Protonix Iv)  40 mg IVPUSH DAILY [facility]


   Last Admin: 07/10/18 11:35 Dose:  40 mg


Rosuvastatin Calcium (Crestor -)  20 mg PO HS [facility]


   Last Admin: 07/09/18 22:04 Dose:  20 mg











Constitutional: Yes: NAD 


Eyes: Yes: EOM Intact


HENT: Yes: Normocephalic


Neck: Yes: Trachea Midline


Cardiovascular: Yes: Regular Rate and Rhythm


Respiratory: Yes: CTA Bilaterally


Gastrointestinal: Yes: Abdomen, Obese


Edema: No


Integumentary: Yes: WNL


Neurological: Yes: WNL


Labs: 


  


  


 Laboratory Results - last 24 hr











  07/09/18 07/09/18 07/10/18





  16:43 22:05 06:20


 


WBC   


 


RBC   


 


Hgb   


 


Hct   


 


MCV   


 


MCH   


 


MCHC   


 


RDW   


 


Plt Count   


 


MPV   


 


Absolute Neuts (auto)   


 


Neutrophils %   


 


Lymphocytes %   


 


Monocytes %   


 


Eosinophils %   


 


Basophils %   


 


Nucleated RBC %   


 


Sodium   


 


Potassium   


 


Chloride   


 


Carbon Dioxide   


 


Anion Gap   


 


BUN   


 


Creatinine   


 


Creat Clearance w eGFR   


 


POC Glucometer  124  111  143


 


Random Glucose   


 


Calcium   


 


Phosphorus   


 


Magnesium   


 


Total Bilirubin   


 


AST   


 


ALT   


 


Alkaline Phosphatase   


 


Total Protein   


 


Albumin   














  07/10/18 07/10/18 07/10/18





  08:10 08:10 11:34


 


WBC  7.0  


 


RBC  4.10  


 


Hgb  13.2  


 


Hct  37.6  


 


MCV  91.8  


 


MCH  32.1  


 


MCHC  35.0  


 


RDW  13.6  


 


Plt Count  252  


 


MPV  7.0 L  


 


Absolute Neuts (auto)  5.1  


 


Neutrophils %  72.4  D  


 


Lymphocytes %  19.7  D  


 


Monocytes %  6.4  


 


Eosinophils %  1.0  


 


Basophils %  0.5  


 


Nucleated RBC %  0  


 


Sodium   140 


 


Potassium   4.0 


 


Chloride   107 


 


Carbon Dioxide   23 


 


Anion Gap   10 


 


BUN   12 


 


Creatinine   1.1 


 


Creat Clearance w eGFR   > 60 


 


POC Glucometer    150


 


Random Glucose   133 H 


 


Calcium   9.0 


 


Phosphorus   3.0 


 


Magnesium   1.9 


 


Total Bilirubin   0.4 


 


AST   97 H D 


 


ALT   97 H D 


 


Alkaline Phosphatase   158 H D 


 


Total Protein   6.7 


 


Albumin   3.5 














Problem List





- Problems


(1) Liver lesion


Code(s): K76.9 - LIVER DISEASE, UNSPECIFIED   





(2) Lung nodule


Code(s): R91.1 - SOLITARY PULMONARY NODULE   





(3) Sigmoid diverticulitis


Code(s): K57.32 - DVTRCLI OF LG INT W/O PERFORATION OR ABSCESS W/O BLEEDING   





Assessment/Plan


INCIDENTAL FINDING OF LUNG MASS ON CT CHEST W POSSIBLE LIVER LESIONS


RESOLVING SIGMOID DIVERTICULITIS





FOLLOW BIOPSY RESULTS  


NO SMOKING 


O2 AS NEEDED


PO AS TOLERATED 


D/C PLANNING 





DR MELGAR

## 2018-07-10 NOTE — DS
Physical Exam: 


SUBJECTIVE: Patient seen and examined. Overnight Pt. had no acute events. Pt. 

endorses feeling pain from liver biopsy site and was given morphine PRN. 








OBJECTIVE:





 Vital Signs











 Period  Temp  Pulse  Resp  BP Sys/Tiwari  Pulse Ox


 


 Last 24 Hr  98.5 F-98.6 F  65-80  19-21  141-159/87-94  98








PHYSICAL EXAM





GENERAL: The patient is awake, alert, and fully oriented, in no acute distress.


LUNGS: Breath sounds equal, clear to auscultation bilaterally, no wheezes, no 

crackles, no accessory muscle use. 


HEART: Regular rate and rhythm, S1, S2 without murmur


ABDOMEN: Soft, nondistended, normoactive bowel sounds, tenderness to palpation 

around RUQ near biopsy site, slight guarding of epigastrium. Pt. had no 

tenderness or guarding in the lower abdominal quadrants or periumbilical area. 


EXTREMITIES: 2+ pulses, warm, well-perfused, no edema. 


PSYCH: Normal mood, normal affect.


SKIN: Warm, dry, normal turgor





LABS


 Laboratory Results - last 24 hr











  07/09/18 07/09/18 07/10/18





  16:43 22:05 06:20


 


WBC   


 


RBC   


 


Hgb   


 


Hct   


 


MCV   


 


MCH   


 


MCHC   


 


RDW   


 


Plt Count   


 


MPV   


 


Absolute Neuts (auto)   


 


Neutrophils %   


 


Lymphocytes %   


 


Monocytes %   


 


Eosinophils %   


 


Basophils %   


 


Nucleated RBC %   


 


Sodium   


 


Potassium   


 


Chloride   


 


Carbon Dioxide   


 


Anion Gap   


 


BUN   


 


Creatinine   


 


Creat Clearance w eGFR   


 


POC Glucometer  124  111  143


 


Random Glucose   


 


Calcium   


 


Phosphorus   


 


Magnesium   


 


Total Bilirubin   


 


AST   


 


ALT   


 


Alkaline Phosphatase   


 


Total Protein   


 


Albumin   














  07/10/18 07/10/18 07/10/18





  08:10 08:10 11:34


 


WBC  7.0  


 


RBC  4.10  


 


Hgb  13.2  


 


Hct  37.6  


 


MCV  91.8  


 


MCH  32.1  


 


MCHC  35.0  


 


RDW  13.6  


 


Plt Count  252  


 


MPV  7.0 L  


 


Absolute Neuts (auto)  5.1  


 


Neutrophils %  72.4  D  


 


Lymphocytes %  19.7  D  


 


Monocytes %  6.4  


 


Eosinophils %  1.0  


 


Basophils %  0.5  


 


Nucleated RBC %  0  


 


Sodium   140 


 


Potassium   4.0 


 


Chloride   107 


 


Carbon Dioxide   23 


 


Anion Gap   10 


 


BUN   12 


 


Creatinine   1.1 


 


Creat Clearance w eGFR   > 60 


 


POC Glucometer    150


 


Random Glucose   133 H 


 


Calcium   9.0 


 


Phosphorus   3.0 


 


Magnesium   1.9 


 


Total Bilirubin   0.4 


 


AST   97 H D 


 


ALT   97 H D 


 


Alkaline Phosphatase   158 H D 


 


Total Protein   6.7 


 


Albumin   3.5 











HOSPITAL COURSE:





Date of Admission:07/05/18





Date of Discharge: 07/10/18





Pre-hospital: The patient is a 74 yo m w/ PMH HTN, DM comes in complaining of 

lower abdominal pain, diarrhea and decreased appetite found to have acute 

diverticulitis with an incidental finding of lung nodule and liver lesions on 

CT. 





Hospital: Pt. was admitted based on the CTAP confirming diverticulitis. Pt. 

started antibiotics and bowel rest for diverticulitis and showed clinical signs 

of improvement. CT also showed a lung nodule and liver masses. Pt. consulted 

with Pulmonology, Gastroenterology, and Hematology/Oncology. Pt. underwent 

biopsy of the liver and scheduled outpatient lung biopsy and colonoscopy. 

Hospital course discussed with patient and local primary care physician set up 

to coordinate managed care. Pt. is stable for discharge and follow-up in a 

week. 





Minutes to complete discharge: 36





Discharge Summary


Reason For Visit: LESION OF LIVER,DIVERTICULITIS,ABD PAIN


Current Active Problems





Abdominal pain (Acute)


Abdominal pain (Acute)


Abnormal finding on lung imaging (Acute)


Abnormal liver diagnostic imaging (Acute)


Diverticulitis (Acute)


Kidney dysfunction (Acute)


Liver lesion (Acute)


Lung nodule (Acute)


Sigmoid diverticulitis (Acute)








Condition: Improved





- Instructions


Diet, Activity, Other Instructions: 





You came to the hospital for increasing abdominal pain, decreased appetite and 

diarrhea. 


You were admitted for an infection of your intestines(diverticulitis) 


You were found to have suspicious masses in you lungs and liver after the CT 

scan.


You had a small piece of the mass in your liver taken out(biopsy) for 

evaluation.





For your pain you can take Motrin as instructed. Please do not exceed the 

maximum dose of 2,400 mg in one day.





Please decrease the fiber in your diet for ONE Month following discharge.





Please increase the fiber in your diet starting August 10,2018.








Please take Metronidazole (Flagyl) 500 mg every 8 hours for 4 days. 


Please take Levofloxacin (Levaquin) 500 mg ONCE a day for 4 days





Please follow with your PCP, Yazan Rankin MD in 1 week at [address] at 11AM. Your liver enzymes can up slightly after the procedure. They 

should be repeated in 1 week to ensure they are going down. 


Please follow-up with your Gastroenterologist, Dr. August, in 2 weeks for 

evaluation of your intestine infection and to schedule a colonoscopy. 


Please follow-up with your Pulmonologist, Dr. Serna in 2 weeks to have a small 

piece of the mass in your lung taken out(biopsy) for evaluation.


Please follow-up with your Hematologist/Oncologist, Dr. Cisneros in 2 weeks

, Dr. Boyer, to go over the results of the lung and liver biopsies. 


 





If you develop increase pain at your biopsy site and is not being controlled 

with over the counter medication return to the ER for evaluation.





Referrals: 


Rogelio Crews MD [Staff Physician] - 1 Week


Edson Serna MD [Staff Physician] - 2 Weeks


Yazan Rankin RES [Resident] - 1 Week


Syed August MD [Staff Physician] - 2 Weeks


Disposition: HOME





- Home Medications


Comprehensive Discharge Medication List: 


Ambulatory Orders





Lisinopril 20 mg PO DAILY 07/05/18 


Metformin HCl [Metformin HCl ER] 500 mg PO BID 07/05/18 


Multivitamin [Multiple Vitamins] 1 each PO DAILY 07/05/18 


Rosuvastatin [Crestor -] 20 mg PO DAILY 07/05/18 


Levofloxacin [Levaquin] 500 mg PO DAILY #5 tablet 07/10/18 


metroNIDAZOLE [Flagyl -] 500 mg PO Q8H #15 tablet 07/10/18 








This patient is new to me today: No


Emergency Visit: No


Critical Care patient: No





- Discharge Referral


Referred to [facility] No

## 2018-07-10 NOTE — PN
Teaching Attending Note


Name of Resident: Yazan Rankin





ATTENDING PHYSICIAN STATEMENT





I saw and evaluated the patient.


I reviewed the resident's note and discussed the case with the resident.


I agree with the resident's findings and plan as documented.








SUBJECTIVE:pain at liver bx site has improved. no other abdominal pain. 

tolerating liquid diet. requesting regular food. deneis CP, SOB, fever, chills, 

N/V/C/D








OBJECTIVE:


 Last Vital Signs











Temp Pulse Resp BP Pulse Ox


 


 98.5 F   77   21   141/87   98 


 


 07/10/18 06:00  07/10/18 06:00  07/10/18 13:48  07/10/18 13:48  07/09/18 16:05





General NAD


Abdomen RUQ tenderness at incision site. no L sided abdominal pain, no 

distention








ASSESSMENT AND PLAN:


74 yo M with PMHx of HTN, HLD, NIDDM, colonic polyps admitted with acute 

uncomplicated sigmoid diverticulitis and found with hepatic lesions concerning 

for neoplastic disease and left lung nodule.


1. Acute uncomplicated sigmoid diverticulitis- clinically improved. tolerating 

clears. will advance to low residue diet. cont levaqin/flagyl day 6. will 

complete 10 day course. will need to f/u with GI and have colonoscopy in 4-

6weeks


2. Acute transaminitis- likely due to liver bx yesterday. will need to have LFT 

repeated in 1 week. f/u with GI


3. ADDIS, likely dehdyration with ongoing ACEI use. now resolved. 


4. Lactic acidosis, likely dehydration+/- metformin use. now resolved


5. Hepatic lesions concerning for neoplastic disease, ?metastatic from ?LLL 

lung mass vs colon origin, ?prostatic origin- elevated AFP. s/p liver bx 7/9. 

will need to f/u for results n 1 week


6. HTN- controlled. cont medications


7. HLD


8. NIDDM- sugars controlled.resume home medications on discharge


9. DVT ppx- hep sq


10. will advance diet if tolerates can d/c home

## 2018-07-12 NOTE — PATH
Surgical Pathology Report



Patient Name:  CASSIA BANKS

Accession #:  [id number]

[facility]. Rec. #:  [id number]                                                        

   /Age/Gender:  1945 (Age: 73) / M

Account:  [id number]                                                          

             Location: [address] [place] [address]/[facility]

Taken:  2018

Received:  7/10/2018

Reported:  2018

Physicians:  LESVIA Hodge M.D. Smitha Mellacheruvu, M.D.

  



Specimen(s) Received

 LIVER BIOPSY 





Clinical History

Lung mass, multiple liver masses







Final Diagnosis

LIVER, BIOPSY:

HIGH GRADE NEUROENDOCRINE CARCINOMA, SMALL CELL CARCINOMA. 



Comment: Histologic sections show diffuse sheets of malignant cells with scant

cytoplasm, granular chromatin, nuclear molding, and brisk mitosis. Areas of

necrosis are identified. Immunohistochemical stains performed and interpreted at

[facility] show the tumor is positive for cytokeratin AE 1-3,

synaptophysin, TTF-1, and CK-7 (focal), while negative for CK20 and

chromogranin. Additional immunohistochemical stains performed at [place], [place] ([id number]) and interpreted at [facility] show the tumor is positive for CD56. Proliferative marker Ki67 is high

(~50%). Overall findings support the diagnosis. Findings discussed with Dr. Cisneros.







***Electronically Signed***

Lizette Poe M.D.





Gross Description

Received in formalin, labeled "liver biopsy" is fragmented tan soft tissue

ranging from 0.1 cm. to 0.5cm. in length and less than 0.1cm in diameter. The

specimen is submitted in toto in one cassette.

KWS/7/10/2018



sulki/7/10/2018

## 2018-07-16 NOTE — PN
Progress Note (short form)





- Note


Progress Note: 





Called patient and  informed him about the diagnosis. HE was informed of the 

diagnosis by his primary team today. Gave him an appointment tomorrow at 4 pm 

for visit . Discussed that he needs to be treated in a timely manner given 

liver involvement